# Patient Record
Sex: MALE | Race: WHITE | NOT HISPANIC OR LATINO | Employment: OTHER | URBAN - METROPOLITAN AREA
[De-identification: names, ages, dates, MRNs, and addresses within clinical notes are randomized per-mention and may not be internally consistent; named-entity substitution may affect disease eponyms.]

---

## 2023-07-13 LAB
PROSTATE SPECIFIC AG (NG/ML) IN SER/PLAS: 0.7 NG/ML (ref 0–4)
PROSTATE SPECIFIC AG FREE (NG/ML) IN SER/PLAS: 0.2 NG/ML
PROSTATE SPECIFIC AG FREE/PROSTATE SPECIFIC AG TOTAL IN SER/PLAS: 29 %

## 2023-09-30 PROBLEM — G56.03 BILATERAL CARPAL TUNNEL SYNDROME: Status: ACTIVE | Noted: 2023-09-30

## 2023-09-30 PROBLEM — I10 ESSENTIAL HYPERTENSION: Status: ACTIVE | Noted: 2023-09-30

## 2023-09-30 PROBLEM — H55.00 NYSTAGMUS: Status: ACTIVE | Noted: 2023-09-30

## 2023-09-30 PROBLEM — K42.9 UMBILICAL HERNIA: Status: ACTIVE | Noted: 2023-09-30

## 2023-09-30 PROBLEM — I65.29 CAROTID ARTERY STENOSIS: Status: ACTIVE | Noted: 2023-09-30

## 2023-09-30 PROBLEM — R26.89 BALANCE PROBLEMS: Status: ACTIVE | Noted: 2023-09-30

## 2023-09-30 PROBLEM — N20.0 KIDNEY STONE ON LEFT SIDE: Status: ACTIVE | Noted: 2023-09-30

## 2023-09-30 PROBLEM — E78.5 HYPERLIPIDEMIA: Status: ACTIVE | Noted: 2023-09-30

## 2023-09-30 PROBLEM — G47.9 SLEEP DISORDER: Status: ACTIVE | Noted: 2023-09-30

## 2023-09-30 PROBLEM — N18.30 CHRONIC KIDNEY DISEASE (CKD), STAGE III (MODERATE) (MULTI): Status: ACTIVE | Noted: 2023-09-30

## 2023-09-30 PROBLEM — H34.9 RETINAL EMBOLUS: Status: ACTIVE | Noted: 2023-09-30

## 2023-09-30 PROBLEM — S49.90XA: Status: ACTIVE | Noted: 2023-09-30

## 2023-09-30 PROBLEM — N28.9 RENAL INSUFFICIENCY: Status: ACTIVE | Noted: 2023-09-30

## 2023-09-30 PROBLEM — I25.10 CAD (CORONARY ARTERY DISEASE): Status: ACTIVE | Noted: 2023-09-30

## 2023-09-30 PROBLEM — H53.2 DIPLOPIA: Status: ACTIVE | Noted: 2023-09-30

## 2023-09-30 PROBLEM — E79.0 HYPERURICEMIA: Status: ACTIVE | Noted: 2023-09-30

## 2023-09-30 PROBLEM — D69.6 THROMBOCYTOPENIA (CMS-HCC): Status: ACTIVE | Noted: 2023-09-30

## 2023-09-30 PROBLEM — I48.91 ATRIAL FIBRILLATION (MULTI): Status: ACTIVE | Noted: 2023-09-30

## 2023-09-30 RX ORDER — METOPROLOL TARTRATE 50 MG/1
1 TABLET ORAL EVERY 12 HOURS
COMMUNITY
Start: 2021-08-03 | End: 2024-02-01

## 2023-09-30 RX ORDER — LOSARTAN POTASSIUM 50 MG/1
50 TABLET ORAL DAILY
COMMUNITY
Start: 2023-02-07 | End: 2024-02-14 | Stop reason: SDUPTHER

## 2023-09-30 RX ORDER — FEBUXOSTAT 40 MG/1
1 TABLET, FILM COATED ORAL DAILY
COMMUNITY
Start: 2021-03-19 | End: 2024-02-14 | Stop reason: SDUPTHER

## 2023-09-30 RX ORDER — ASPIRIN 81 MG/1
1 TABLET ORAL DAILY
COMMUNITY

## 2023-09-30 RX ORDER — CELECOXIB 200 MG/1
CAPSULE ORAL
COMMUNITY
Start: 2023-01-31

## 2023-09-30 RX ORDER — HYDROCHLOROTHIAZIDE 12.5 MG/1
1 TABLET ORAL DAILY
COMMUNITY
Start: 2021-08-03 | End: 2024-02-14 | Stop reason: SDUPTHER

## 2023-09-30 RX ORDER — LISINOPRIL 20 MG/1
1 TABLET ORAL 2 TIMES DAILY
COMMUNITY
Start: 2021-08-03 | End: 2024-02-15 | Stop reason: WASHOUT

## 2023-09-30 RX ORDER — RIVAROXABAN 20 MG/1
1 TABLET, FILM COATED ORAL DAILY
COMMUNITY
Start: 2021-08-03

## 2023-09-30 RX ORDER — ZOLPIDEM TARTRATE 5 MG/1
5 TABLET ORAL NIGHTLY PRN
COMMUNITY
Start: 2021-02-04 | End: 2024-03-08 | Stop reason: SDUPTHER

## 2023-09-30 RX ORDER — CALCIUM CARBONATE/VITAMIN D3 600MG-5MCG
1 TABLET ORAL DAILY
COMMUNITY

## 2023-09-30 RX ORDER — SIMVASTATIN 40 MG/1
1 TABLET, FILM COATED ORAL NIGHTLY
COMMUNITY
Start: 2021-08-03 | End: 2024-02-01

## 2023-10-04 ENCOUNTER — EVALUATION (OUTPATIENT)
Dept: PHYSICAL THERAPY | Facility: CLINIC | Age: 78
End: 2023-10-04
Payer: MEDICARE

## 2023-10-04 DIAGNOSIS — R26.89 BALANCE PROBLEMS: Primary | ICD-10-CM

## 2023-10-04 DIAGNOSIS — R26.89 OTHER ABNORMALITIES OF GAIT AND MOBILITY: ICD-10-CM

## 2023-10-04 DIAGNOSIS — R53.1 GENERAL WEAKNESS: ICD-10-CM

## 2023-10-04 PROCEDURE — 97162 PT EVAL MOD COMPLEX 30 MIN: CPT | Mod: GP,KX

## 2023-10-04 ASSESSMENT — BALANCE ASSESSMENTS
SITTING WITH BACK UNSUPPORTED BUT FEET SUPPORTED ON FLOOR OR ON A STOOL: ABLE TO SIT SAFELY AND SECURELY FOR 2 MINUTES
REACHING FORWARD WITH OUTSTRETCHED ARM WHILE STANDING: CAN REACH FORWARD 12 CM (5 INCHES)
PICK UP OBJECT FROM THE FLOOR FROM A STANDING POSITION: ABLE TO PICK UP SLIPPER BUT NEEDS SUPERVISION
PLACE ALTERNATE FOOT ON STEP OR STOOL WHILE STANDING UNSUPPORTED: LOOKS BEHIND FROM BOTH SIDES AND WEIGHT SHIFTS WELL
TURN 360 DEGREES: ABLE TO TURN 360 DEGREES SAFELY ONE SIDE ONLY 4 SECONDS OR LESS
STANDING TO SITTING: ABLE TO STAND INDEPENDENTLY USING HANDS
STANDING UNSUPPORTED: ABLE TO STAND 2 MINUTES WITH SUPERVISION
STANDING UNSUPPORTED WITH EYES CLOSED: NEEDS HELP TO KEEP FROM FALLING
TRANSFERS: ABLE TO TRANSFER SAFELY WITH MINOR USE OF HANDS
STANDING TO SITTING: SITS SAFELY WITH MINIMAL USE OF HANDS
STANDING UNSUPPORTED WITH FEET TOGETHER: ABLE TO PLACE FEET TOGETHER INDEPENDENTLY BUT UNABLE TO HOLD FOR 30 SECONDS

## 2023-10-04 ASSESSMENT — ENCOUNTER SYMPTOMS
DEPRESSION: 0
OCCASIONAL FEELINGS OF UNSTEADINESS: 0
LOSS OF SENSATION IN FEET: 1

## 2023-10-04 NOTE — PROGRESS NOTES
Physical Therapy    Physical Therapy Evaluation    Patient Name: Terrell Malhotra  MRN: [id number]  Today's Date: 10/4/2023  Time Calculation  Start Time: 1400  Stop Time: 1450  Time Calculation (min): 50 min    Assessment  Pt is a 77 year old male presenting to PT with gait/mobility impairments. Standardized testing of BAIG, sensation, MMT and ABC reveal that pt has multiple impairments in body structures/functions, activity limitations and participation restrictions. These include subjective and objective findings such as decreased righting reactions, poor sensation, decreased LE strength and impaired static balance.  Pt to benefit from skilled PT interventions to improve functional mobility deficits to improve independence and decrease fall risk.        Plan  Treatment/Interventions: Education/ Instruction, Gait training, Neuromuscular re-education, Therapeutic activities, Therapeutic exercises  PT Plan: Skilled PT  PT Frequency: 2 times per week  Duration: 6 weeks  Onset Date: 10/01/19  Certification Period Start Date: 10/04/23  Certification Period End Date: 01/04/24  Rehab Potential: Good        Subjective   Pt reports to PT ambulatory without  device, accompanied by spouse Carol. Pt reports having issues with balance over the last five years. Pt reports, at times in standing feels as if he is jerking back and forth. Additionally has had ~2 falls due to tripping over objects.     General:  General  Reason for Referral: Abnormalities of gait/mobility  Referred By: Dr Carlos Eduardo Han  Precautions:  Precautions  STEADI Fall Risk Score (The score of 4 or more indicates an increased risk of falling): 3  Home Living:  Home Living Comment: [place], [address].  Prior Function Per Pt/Caregiver Report:  Level of [place]: Independent with ADLs and functional transfers    Objective     Strength:   Right lower extremity  Hip; WFL  Knee; WFL  Ankle dorsiflexion; 4-/5  Ankle plantarflexion; 4-/5    Left  lower extremity  Hip; WFL  Knee; WFL  Ankle dorsiflexion; 4-/5  Ankle plantarflexion; 4-/5   Sensation:  Impaired sensation in B feet         Outcome Measures:  Baig Balance Scale  1. Sitting to Standing: Able to stand independently using hands  2. Standing Unsupported: Able to stand 2 minutes with supervision  3. Sitting with Back Unsupported but Feet Supported on Floor or on a Stool: Able to sit safely and securely for 2 minutes  4. Standing to Sitting: Sits safely with minimal use of hands  5.  Transfers: Able to transfer safely with minor use of hands  6. Standing Unsupported with Eyes Closed: Needs help to keep from falling  7. Standing Unsupported with Feet Together: Able to place feet together independently but unable to hold for 30 seconds  8. Reach Forward with Outstretched Arm While Standing: Can reach forward 12 cm (5 inches)  9.  Object from Floor from a Standing Position: Able to  slipper but needs supervision  10. Turning to Look Behind Over Left and Right Shoulders While Standing: Looks behind from both sides and weight shifts well  11. Turn 360 Degrees: Able to turn 360 degrees safely one side only 4 seconds or less         Other Measures  Activities - Specific Balance Confidence Scale: 75.625%     OP EDUCATION:  Education  Individual(s) Educated: Patient, Spouse  Education Provided: Fall Risk, POC, Home Exercise Program (HEP; tandem stance, SLS, romberg stance, heel raises)  Plan of Care Discussed and Agreed Upon: yes  Patient Response to Education: Patient/Caregiver Verbalized Understanding of Information    Goals:  Pt will improve BAIG by 4 points to meet LATIA  Pt will improve ABC by 5% to improve confidence with mobility  Pt will report 0 falls throughout treatment  Pt will demonstrate independence with HEP

## 2023-10-16 ENCOUNTER — APPOINTMENT (OUTPATIENT)
Dept: PHYSICAL THERAPY | Facility: CLINIC | Age: 78
End: 2023-10-16
Payer: MEDICARE

## 2023-10-18 ENCOUNTER — APPOINTMENT (OUTPATIENT)
Dept: PHYSICAL THERAPY | Facility: CLINIC | Age: 78
End: 2023-10-18
Payer: MEDICARE

## 2023-10-24 ENCOUNTER — TREATMENT (OUTPATIENT)
Dept: PHYSICAL THERAPY | Facility: CLINIC | Age: 78
End: 2023-10-24
Payer: MEDICARE

## 2023-10-24 DIAGNOSIS — R26.89 BALANCE PROBLEMS: ICD-10-CM

## 2023-10-24 DIAGNOSIS — R53.1 GENERAL WEAKNESS: ICD-10-CM

## 2023-10-24 PROCEDURE — 97530 THERAPEUTIC ACTIVITIES: CPT | Mod: GP,KX

## 2023-10-24 NOTE — PROGRESS NOTES
Physical Therapy    Patient Name: Terrell Malhotra  MRN: [id number]  Today's Date: 10/24/2023  Time Calculation  Start Time: 1019  Stop Time: 1101  Time Calculation (min): 42 min    Insurance reviewed   Visit number: 2   MC 90D 1/4/24    Assessment:  Session focused on improving dynamic balance. Pt requires up to min assist to maintain balance with activity especially challenged with activities in prolonged SLS. Pt remains motivated and participatory throughout. Pt to benefit from continued skilled PT services to further progress balance training for improved functionality.       Plan;  Continue to progress  Dynamic balance  Static balance especially SLS    Current Problem:   1. Balance problems  Follow Up In Physical Therapy      2. General weakness  Follow Up In Physical Therapy          Subjective   Pt reports to PT ambulatory without a device. No medical changes. No falls    Treatments:    Therapeutic Activity (62000): timed minutes 42, units 3   Dynamic warm up; 10 ft each; side stepping x 4, backward amb x 4, high stepping x 2    To improve dynamic balance  -Four square stepping; CW direction x 4 minutes, 2 minutes with change in direction based off therapist cues including diagonal stepping  -Agility ladder, each the length of the ladder; one foot in each square x 6, narrow to wide CARLIE x 4, modified icky shuffle x 4  -Stepping over x 6 hurdles; x 2 with each lead leg  -Pt amb for 3 minutes while therapist provides posterior perturbations to initiate reactive stepping  -In staggered stance; completes resistive reactive stepping; each LE x 12  -Perturbations    Education and discussion on HEP and treatment regarding the benefits related to current condition, POC, pathophysiology, and precautions      Goals;   Pt will improve BAIG by 4 points to meet LATIA  Pt will improve ABC by 5% to improve confidence with mobility  Pt will report 0 falls throughout treatment  Pt will demonstrate independence with HEP

## 2023-10-26 ENCOUNTER — TREATMENT (OUTPATIENT)
Dept: PHYSICAL THERAPY | Facility: CLINIC | Age: 78
End: 2023-10-26
Payer: MEDICARE

## 2023-10-26 DIAGNOSIS — R53.1 GENERAL WEAKNESS: ICD-10-CM

## 2023-10-26 DIAGNOSIS — R26.89 BALANCE PROBLEMS: ICD-10-CM

## 2023-10-26 PROCEDURE — 97530 THERAPEUTIC ACTIVITIES: CPT | Mod: GP,KX

## 2023-10-26 NOTE — PROGRESS NOTES
Physical Therapy    Patient Name: Terrell Malhotra  MRN: [id number]  Today's Date: 10/26/2023  Time Calculation  Start Time: 1017  Stop Time: 1058  Time Calculation (min): 41 min    Insurance reviewed   Visit number: 3   MC 90D 1/4/24    Assessment:  Session focused on improving dynamic balance. With reactive stepping practice, pt with difficulty with posterior stepping taking multiple steps to maintain balance. Pt challenged with alteration of surface type with up to CGA on foam surface.  Pt to benefit from continued skilled PT services to further progress balance training for improved functionality.       Plan;  Continue to progress  Picking up objects from floor  Floor to stand transfer    Current Problem:   1. Balance problems  Follow Up In Physical Therapy      2. General weakness  Follow Up In Physical Therapy          Subjective   Pt reports to PT ambulatory without a device. No medical changes. No falls    Treatments:    Therapeutic Activity (24750): timed minutes 42, units 3     To improve righting reactions   -Utilized overhead harness completing slip ; x 5 minutes and x 8 minutes    To improve SLS stability  -Standing on foam surface completing toe taps onto cones; 2 minutes x 2    To improve stability to  objects  -Squats on foam pad; 2 x 15, U UE support     Education and discussion on HEP and treatment regarding the benefits related to current condition, POC, pathophysiology, and precautions      Goals;   Pt will improve BAIG by 4 points to meet LATIA  Pt will improve ABC by 5% to improve confidence with mobility  Pt will report 0 falls throughout treatment  Pt will demonstrate independence with HEP

## 2023-10-31 ENCOUNTER — TREATMENT (OUTPATIENT)
Dept: PHYSICAL THERAPY | Facility: CLINIC | Age: 78
End: 2023-10-31
Payer: MEDICARE

## 2023-10-31 DIAGNOSIS — R53.1 GENERAL WEAKNESS: ICD-10-CM

## 2023-10-31 DIAGNOSIS — R26.89 BALANCE PROBLEMS: ICD-10-CM

## 2023-10-31 PROCEDURE — 97530 THERAPEUTIC ACTIVITIES: CPT | Mod: GP,KX

## 2023-10-31 NOTE — PROGRESS NOTES
Physical Therapy    Patient Name: Terrell Malhotra  MRN: [id number]  Today's Date: 10/31/2023  Time Calculation  Start Time: 1107  Stop Time: 1145  Time Calculation (min): 38 min    Insurance reviewed   Visit number: 4   MC 90D 1/4/24    Assessment:  Session focused on improving ability to complete floor to stand transfers and retrieving objects from floor level. Pt challenged with transitioning tall kneel to half kneel to stand, likely due to LE and core weakness. Pt receptive to education regarding sequencing of both tasks however, continued follow up likely required . Pt to benefit from continued skilled PT services to further progress balance training for improved functionality.       Plan;  Continue to progress  Floor to stand transfers  Righting reactions  Dynamic balance    Current Problem:   1. Balance problems  Follow Up In Physical Therapy      2. General weakness  Follow Up In Physical Therapy          Subjective   Pt reports to PT ambulatory without a device. No medical changes. No falls    Treatments:    Therapeutic Activity (66895): timed minutes 42, units 3     To improve floor to stand transfer  -Pt demonstrates ability to independently complete floor to stand transfer with usage of UE support on mat table; with removal of UE support, pt unable to transition tall kneel to half kneel  --Completed tall kneel to half kneel transition with UE support on body bar; x 12 reciprocally with therapist stabilizing bar, x 12 reciprocally without therapist stabilization support  --Standing half lunges; 2 x 12 each LE lead, U UE support    To improve picking up objects off floor  -Squatting picking up bean bags x 15, CGA  -Pt amb to objects on the floor to practice squat technique; x 15; CGA due to change in direction    Education and discussion on HEP and treatment regarding the benefits related to current condition, POC, pathophysiology, and precautions      Goals;   Pt will improve BAIG by 4 points to meet LATIA  Pt  will improve ABC by 5% to improve confidence with mobility  Pt will report 0 falls throughout treatment  Pt will demonstrate independence with HEP

## 2023-11-02 ENCOUNTER — TREATMENT (OUTPATIENT)
Dept: PHYSICAL THERAPY | Facility: CLINIC | Age: 78
End: 2023-11-02
Payer: MEDICARE

## 2023-11-02 DIAGNOSIS — R26.89 BALANCE PROBLEMS: ICD-10-CM

## 2023-11-02 DIAGNOSIS — R53.1 GENERAL WEAKNESS: ICD-10-CM

## 2023-11-02 PROCEDURE — 97110 THERAPEUTIC EXERCISES: CPT | Mod: GP,KX

## 2023-11-02 NOTE — PROGRESS NOTES
Physical Therapy    Patient Name: Terrell Malhotra  MRN: [id number]  Today's Date: 11/2/2023  Time Calculation  Start Time: 1117  Stop Time: 1145  Time Calculation (min): 28 min    Insurance reviewed   Visit number: 5   MC 90D 1/4/24    Assessment:  Session focused on improving floor to stand transfers. Pt challenged with core related exercise which likely is contributing factor to difficulty with floor to stand transfers.  Pt to benefit from continued skilled PT services to further progress balance training for improved functionality.       Plan;  Continue to progress  Floor to stand transfers  Righting reactions  Dynamic balance    Current Problem:   1. Balance problems  Follow Up In Physical Therapy      2. General weakness  Follow Up In Physical Therapy          Subjective   Pt reports to PT ambulatory without a device. No medical changes. No falls    Treatments:    Therapeutic Exercise (11384): timed minutes 25, units 2     To improve floor to stand transfer  --Standing half lunges; 2 x 12 each LE lead, U UE support  --Palof press; 7.5#; 2 x 15  --Standing march on foam surface with 2# on each LE; 2 x 2 minutes, UE support PRN      Education and discussion on HEP and treatment regarding the benefits related to current condition, POC, pathophysiology, and precautions      Goals;   Pt will improve BAIG by 4 points to meet LATIA  Pt will improve ABC by 5% to improve confidence with mobility  Pt will report 0 falls throughout treatment  Pt will demonstrate independence with HEP

## 2023-11-07 ENCOUNTER — TREATMENT (OUTPATIENT)
Dept: PHYSICAL THERAPY | Facility: CLINIC | Age: 78
End: 2023-11-07
Payer: MEDICARE

## 2023-11-07 DIAGNOSIS — R53.1 GENERAL WEAKNESS: ICD-10-CM

## 2023-11-07 DIAGNOSIS — R26.89 BALANCE PROBLEMS: ICD-10-CM

## 2023-11-07 PROCEDURE — 97530 THERAPEUTIC ACTIVITIES: CPT | Mod: GP,KX

## 2023-11-07 PROCEDURE — 97110 THERAPEUTIC EXERCISES: CPT | Mod: GP,KX

## 2023-11-07 NOTE — PROGRESS NOTES
Physical Therapy    Patient Name: Terrell Malhotra  MRN: [id number]  Today's Date: 11/7/2023  Time Calculation  Start Time: 1102  Stop Time: 1145  Time Calculation (min): 43 min    Insurance reviewed   Visit number: 6   MC 90D 1/4/24    Assessment:  Session focused on improving balance. Pt continues to be challenged in half kneel position which limits ability to complete floor to stand transfers without UE support. Pt to benefit from continued skilled PT services to further progress balance training for improved functionality.       Plan;  Continue to progress  Floor to stand transfers  Righting reactions  Dynamic balance  Improving step height    Current Problem:   1. Balance problems  Follow Up In Physical Therapy      2. General weakness  Follow Up In Physical Therapy          Subjective   Pt reports to PT ambulatory without a device. No medical changes. No falls    Treatments:    Therapeutic Exercise (61129): timed minutes 25, units 2   Dynamic warm up; standing squats 2 x 12, calf raises x 15    To improve floor to stand transfer  --Standing half lunges; 2 x 12 each LE lead, U UE support  --Lunge hold at floor level; 4 x 30 second holds each LE lead    Therapeutic Activity (62403): timed minutes 15, units 1    To improve standing balance  -Reactive stepping with blue theraband at ankle level; x 12 each LE, U UE support PRN  -Stepping forward over x 5 hurdles reciprocally; 6 sets in total; occasional min assist to maintain stability in prolonged SLS     Education and discussion on HEP and treatment regarding the benefits related to current condition, POC, pathophysiology, and precautions      Goals;   Pt will improve BAIG by 4 points to meet LATIA  Pt will improve ABC by 5% to improve confidence with mobility  Pt will report 0 falls throughout treatment  Pt will demonstrate independence with HEP

## 2023-11-09 ENCOUNTER — TREATMENT (OUTPATIENT)
Dept: PHYSICAL THERAPY | Facility: CLINIC | Age: 78
End: 2023-11-09
Payer: MEDICARE

## 2023-11-09 DIAGNOSIS — R26.89 BALANCE PROBLEMS: ICD-10-CM

## 2023-11-09 DIAGNOSIS — R53.1 GENERAL WEAKNESS: ICD-10-CM

## 2023-11-09 PROCEDURE — 97530 THERAPEUTIC ACTIVITIES: CPT | Mod: GP,KX

## 2023-11-09 NOTE — PROGRESS NOTES
Physical Therapy    Patient Name: Terrell Malhotra  MRN: [id number]  Today's Date: 11/9/2023  Time Calculation  Start Time: 1057  Stop Time: 1139  Time Calculation (min): 42 min    Insurance reviewed   Visit number: 7   MC 90D 1/4/24    Assessment:  Session focused on improving stability in SLS position. Pt challenged with prolonged stance, requiring up to min assist at times. Improves with repetitions however does experience fatigue which diminishes stability. Pt to benefit from continued skilled PT services to further progress balance training for improved functionality.       Plan;  Continue to progress  Floor to stand transfers  Righting reactions  Dynamic balance  Improving step height    Current Problem:   1. Balance problems  Follow Up In Physical Therapy      2. General weakness  Follow Up In Physical Therapy          Subjective   Pt reports to PT ambulatory without a device. No medical changes. No falls    Treatments:      Therapeutic Activity (08653): timed minutes 40, units 3    As a dynamic warm up, each 10 ft; side stepping x 2 each way, high stepping x 3, toe amb x 2, heel amb x 2    To improve SLS stability  -Toe taps onto 8 inch riser x 20 reciprocally, onto 12 inch riser x 20 reciprocally, no UE support  -Step ups onto 8 inch riser; x 15 no UE support  -Stepping forward over x 5 hurdles; x 4, stepping laterally over x 5 hurdles x 2 each direction, stepping forward over x 5 hurdles onto foam pad surface x 4; all with no UE support  -Amb across stepping stones in narrow CARLIE; 2 minutes x 2, attempted without UE support  -Standing in SLS while contralateral LE rolls ball FW/BW; x 75 seconds each LE    Education and discussion on HEP and treatment regarding the benefits related to current condition, POC, pathophysiology, and precautions      Goals;   Pt will improve BAIG by 4 points to meet LATIA  Pt will improve ABC by 5% to improve confidence with mobility  Pt will report 0 falls throughout treatment  Pt will  demonstrate independence with HEP

## 2023-11-14 ENCOUNTER — TREATMENT (OUTPATIENT)
Dept: PHYSICAL THERAPY | Facility: CLINIC | Age: 78
End: 2023-11-14
Payer: MEDICARE

## 2023-11-14 DIAGNOSIS — R26.89 BALANCE PROBLEMS: ICD-10-CM

## 2023-11-14 DIAGNOSIS — R53.1 GENERAL WEAKNESS: ICD-10-CM

## 2023-11-14 PROCEDURE — 97530 THERAPEUTIC ACTIVITIES: CPT | Mod: GP,KX

## 2023-11-14 NOTE — PROGRESS NOTES
Physical Therapy    Patient Name: Terrell Malhotra  MRN: [id number]  Today's Date: 11/14/2023  Time Calculation  Start Time: 1101  Stop Time: 1145  Time Calculation (min): 44 min    Insurance reviewed   Visit number: 8   MC 90D 1/4/24    Assessment:  Session focused on improving reactive stepping and LE power. With resistive walk outs pt with difficulty with control requiring up to CGA to maintain stability. Pt with continued difficulty with floor to stand transfers due to instability in half kneel position.  Pt to benefit from continued skilled PT services to further progress balance training for improved functionality.       Plan;  Continue to progress  Floor to stand transfers  Righting reactions  Dynamic balance  Improving step height    Current Problem:   1. Balance problems  Follow Up In Physical Therapy      2. General weakness  Follow Up In Physical Therapy          Subjective   Pt reports to PT ambulatory without a device. No medical changes. No falls    Treatments:    Therapeutic Activity (30850): timed minutes 40, units 3    As a dynamic warm up, each 10 ft; side stepping x 2 each way, high stepping x 3, toe amb x 2, heel amb x 2    To improve reactive stepping  -FW-->BW amb with 7.5#; 10 ft x 8  -BW-->FW amb with 7.5#; 10 ft x 8  -Reactive stepping with purple resistance band; x 10 stepping each LE lead    To improve floor to stand transfers  -Half kneel hold in erect position; 2 x 60 second     To improve LE power for floor to stand transfer  -Runners step up with 3# on each LE; x 20 reciprocally, U UE support    Education and discussion on HEP and treatment regarding the benefits related to current condition, POC, pathophysiology, and precautions      Goals;   Pt will improve BAIG by 4 points to meet LATIA  Pt will improve ABC by 5% to improve confidence with mobility  Pt will report 0 falls throughout treatment  Pt will demonstrate independence with HEP   English

## 2023-11-16 ENCOUNTER — TREATMENT (OUTPATIENT)
Dept: PHYSICAL THERAPY | Facility: CLINIC | Age: 78
End: 2023-11-16
Payer: MEDICARE

## 2023-11-16 DIAGNOSIS — R53.1 GENERAL WEAKNESS: ICD-10-CM

## 2023-11-16 DIAGNOSIS — R26.89 BALANCE PROBLEMS: ICD-10-CM

## 2023-11-16 PROCEDURE — 97530 THERAPEUTIC ACTIVITIES: CPT | Mod: GP,KX

## 2023-11-16 NOTE — PROGRESS NOTES
Physical Therapy    Patient Name: Terrell Malhotra  MRN: [id number]  Today's Date: 11/16/2023  Time Calculation  Start Time: 1100  Stop Time: 1143  Time Calculation (min): 43 min    Insurance reviewed   Visit number: 9   MC 90D 1/4/24    Assessment:  Session focused on improving dynamic balance. With each new activity, difficulties initially however pt demonstrates a learning process and is able to adapt balance appropriately. Continued difficulty with change of surfaces with foam/uneven terrain. Pt to benefit from continued skilled PT services to further progress balance training for improved functionality.       Plan;  Reassessment of goals and prepare for discharge    Current Problem:   1. Balance problems  Follow Up In Physical Therapy      2. General weakness  Follow Up In Physical Therapy          Subjective   Pt reports to PT ambulatory without a device. No medical changes. No falls    Treatments:    Therapeutic Activity (62980): timed minutes 40, units 3    With usage of overhead harness completed the following to challenge dynamic balance  -Tandem amb on balance beam; 12 ft x 6  -Stepping with narrow CARLIE on stepping stones; 12 ft x 6  -Stepping over x 6 hurdles; forward x 4  -Stepping over x 6 hurdles; laterally each direction x 2  -Stepping over x 4 hurdles; with foam surfaces between hurdles forward x 4  -Stepping across mat surface with stepping stones beneath to simulate uneven surfaces; x 6  -Reactive stepping through therapist guided posterior pulls x 12      Education and discussion on HEP and treatment regarding the benefits related to current condition, POC, pathophysiology, and precautions      Goals;   Pt will improve BAIG by 4 points to meet LATIA  Pt will improve ABC by 5% to improve confidence with mobility  Pt will report 0 falls throughout treatment  Pt will demonstrate independence with HEP

## 2023-11-21 ENCOUNTER — APPOINTMENT (OUTPATIENT)
Dept: PHYSICAL THERAPY | Facility: CLINIC | Age: 78
End: 2023-11-21
Payer: MEDICARE

## 2023-11-28 ENCOUNTER — TREATMENT (OUTPATIENT)
Dept: PHYSICAL THERAPY | Facility: CLINIC | Age: 78
End: 2023-11-28
Payer: MEDICARE

## 2023-11-28 DIAGNOSIS — R53.1 GENERAL WEAKNESS: ICD-10-CM

## 2023-11-28 DIAGNOSIS — R26.89 BALANCE PROBLEMS: ICD-10-CM

## 2023-11-28 PROCEDURE — 97530 THERAPEUTIC ACTIVITIES: CPT | Mod: GP,KX

## 2023-11-28 ASSESSMENT — BALANCE ASSESSMENTS
REACHING FORWARD WITH OUTSTRETCHED ARM WHILE STANDING: CAN REACH FORWARD 12 CM (5 INCHES)
PLACE ALTERNATE FOOT ON STEP OR STOOL WHILE STANDING UNSUPPORTED: ABLE TO STAND INDEPENDENTLY AND SAFELY AND COMPLETE 8 STEPS IN 20 SECONDS
STANDING TO SITTING: SITS SAFELY WITH MINIMAL USE OF HANDS
STANDING UNSUPPORTED WITH FEET TOGETHER: ABLE TO PLACE FEET TOGETHER INDEPENDENTLY AND STAND 1 MINUTE SAFELY
SITTING WITH BACK UNSUPPORTED BUT FEET SUPPORTED ON FLOOR OR ON A STOOL: ABLE TO SIT SAFELY AND SECURELY FOR 2 MINUTES
STANDING UNSUPPORTED WITH EYES CLOSED: ABLE TO STAND 10 SECONDS SAFELY
TURN 360 DEGREES: ABLE TO TURN 360 DEGREES SAFELY IN 4 SECONDS OR LESS
STANDING TO SITTING: ABLE TO STAND WITHOUT USING HANDS AND STABILIZE INDEPENDENTLY
STANDING UNSUPPORTED ONE FOOT IN FRONT: ABLE TO TAKE SMALL STEP INDEPENDENTLY AND HOLD 30 SECONDS
PLACE ALTERNATE FOOT ON STEP OR STOOL WHILE STANDING UNSUPPORTED: LOOKS BEHIND FROM BOTH SIDES AND WEIGHT SHIFTS WELL
STANDING ON ONE LEG: TRIES TO LIFT LEG UNABLE TO HOLD 3 SECONDS BUT REMAINS STANDING INDEPENDENTLY
LONG VERSION TOTAL SCORE (MAX 56): 50
STANDING UNSUPPORTED: ABLE TO STAND SAFELY FOR 2 MINUTES
PICK UP OBJECT FROM THE FLOOR FROM A STANDING POSITION: ABLE TO PICK UP SLIPPER SAFELY AND EASILY
TRANSFERS: ABLE TO TRANSFER SAFELY WITH MINOR USE OF HANDS

## 2023-11-28 NOTE — PROGRESS NOTES
Physical Therapy    Patient Name: Terrell Malhotra  MRN: [id number]  Today's Date: 11/28/2023  Time Calculation  Start Time: 1103  Stop Time: 1145  Time Calculation (min): 42 min    Insurance reviewed   Visit number: 10   MC 90D 1/4/24    Assessment:  Session focused on reassessment of goals. Pt achieved 4/4 goals as set on evaluation. Pt and spouse comfortable with pt being discharged at this point. Provided with HEP and urged pt to maintain level of physicality at discharge, especially with coming colder months.       Plan;  Discharge    Current Problem:   1. Balance problems  Follow Up In Physical Therapy      2. General weakness  Follow Up In Physical Therapy          Subjective   Pt reports to PT ambulatory without a device. No medical changes. No falls    Treatments:    Therapeutic Activity (46563): timed minutes 38, units 3    -BAIG; 50/56  -ABC; 83.75%    Prescribed HEP providing pt with demonstration and handouts;   -Side stepping  -Backward amb  -Tandem stance  -SLS   -Romberg eyes closed  -Romberg head rotations  -Heel raises    Education and discussion on HEP and treatment regarding the benefits related to current condition, POC, pathophysiology, and precautions      Goals;   Pt will improve BAIG by 4 points to meet LATIA GOAL ACHIEVED   Pt will improve ABC by 5% to improve confidence with mobility GOAL ACHIEVED  Pt will report 0 falls throughout treatment GOAL ACHIEVED  Pt will demonstrate independence with HEP GOAL ACHIEVED

## 2023-12-26 ENCOUNTER — DOCUMENTATION (OUTPATIENT)
Dept: PHYSICAL THERAPY | Facility: CLINIC | Age: 78
End: 2023-12-26
Payer: MEDICARE

## 2023-12-26 NOTE — PROGRESS NOTES
Physical Therapy    Discharge Summary    Name: Terrell Malhotra  MRN: [id number]  : 1945  Date: 23    Discharge Summary: PT    Discharge Information: Date of last visit 23    Therapy Summary: Pt last seen  and completed goal reassessment. Pt achieved 4/4 goals and felt comfortable with discharge from therapy. Chart left open for 30 days per protocol, pt to be formally discharged with current PT POC. Pt urged to follow up with PT in the future should needs arise.

## 2024-02-09 ENCOUNTER — APPOINTMENT (OUTPATIENT)
Dept: PRIMARY CARE | Facility: CLINIC | Age: 79
End: 2024-02-09
Payer: MEDICARE

## 2024-02-14 ENCOUNTER — OFFICE VISIT (OUTPATIENT)
Dept: PRIMARY CARE | Facility: CLINIC | Age: 79
End: 2024-02-14
Payer: MEDICARE

## 2024-02-14 VITALS
WEIGHT: 202 LBS | SYSTOLIC BLOOD PRESSURE: 109 MMHG | HEIGHT: 73 IN | BODY MASS INDEX: 26.77 KG/M2 | TEMPERATURE: 97.3 F | HEART RATE: 76 BPM | DIASTOLIC BLOOD PRESSURE: 63 MMHG

## 2024-02-14 DIAGNOSIS — I10 ESSENTIAL HYPERTENSION: ICD-10-CM

## 2024-02-14 DIAGNOSIS — Z87.891 FORMER SMOKER: ICD-10-CM

## 2024-02-14 DIAGNOSIS — E78.5 HYPERLIPIDEMIA, UNSPECIFIED HYPERLIPIDEMIA TYPE: ICD-10-CM

## 2024-02-14 DIAGNOSIS — E79.0 HYPERURICEMIA: ICD-10-CM

## 2024-02-14 DIAGNOSIS — Z00.00 MEDICARE ANNUAL WELLNESS VISIT, SUBSEQUENT: ICD-10-CM

## 2024-02-14 PROCEDURE — G0439 PPPS, SUBSEQ VISIT: HCPCS | Performed by: FAMILY MEDICINE

## 2024-02-14 PROCEDURE — 1036F TOBACCO NON-USER: CPT | Performed by: FAMILY MEDICINE

## 2024-02-14 PROCEDURE — 3078F DIAST BP <80 MM HG: CPT | Performed by: FAMILY MEDICINE

## 2024-02-14 PROCEDURE — 3074F SYST BP LT 130 MM HG: CPT | Performed by: FAMILY MEDICINE

## 2024-02-14 PROCEDURE — 1160F RVW MEDS BY RX/DR IN RCRD: CPT | Performed by: FAMILY MEDICINE

## 2024-02-14 PROCEDURE — 1170F FXNL STATUS ASSESSED: CPT | Performed by: FAMILY MEDICINE

## 2024-02-14 PROCEDURE — 1159F MED LIST DOCD IN RCRD: CPT | Performed by: FAMILY MEDICINE

## 2024-02-14 RX ORDER — FEBUXOSTAT 40 MG/1
40 TABLET, FILM COATED ORAL DAILY
Qty: 90 TABLET | Refills: 1 | Status: SHIPPED | OUTPATIENT
Start: 2024-02-14

## 2024-02-14 RX ORDER — SIMVASTATIN 40 MG/1
40 TABLET, FILM COATED ORAL NIGHTLY
Qty: 90 TABLET | Refills: 1 | Status: SHIPPED | OUTPATIENT
Start: 2024-02-14

## 2024-02-14 RX ORDER — HYDROCHLOROTHIAZIDE 12.5 MG/1
12.5 TABLET ORAL DAILY
Qty: 90 TABLET | Refills: 1 | Status: SHIPPED | OUTPATIENT
Start: 2024-02-14

## 2024-02-14 RX ORDER — LOSARTAN POTASSIUM 50 MG/1
50 TABLET ORAL DAILY
Qty: 90 TABLET | Refills: 1 | Status: SHIPPED | OUTPATIENT
Start: 2024-02-14

## 2024-02-14 RX ORDER — METOPROLOL TARTRATE 50 MG/1
50 TABLET ORAL EVERY 12 HOURS
Qty: 180 TABLET | Refills: 1 | Status: SHIPPED | OUTPATIENT
Start: 2024-02-14

## 2024-02-14 ASSESSMENT — ACTIVITIES OF DAILY LIVING (ADL)
BATHING: INDEPENDENT
DRESSING: INDEPENDENT
GROCERY_SHOPPING: INDEPENDENT
MANAGING_FINANCES: INDEPENDENT
DOING_HOUSEWORK: INDEPENDENT
TAKING_MEDICATION: INDEPENDENT

## 2024-02-14 ASSESSMENT — PATIENT HEALTH QUESTIONNAIRE - PHQ9
1. LITTLE INTEREST OR PLEASURE IN DOING THINGS: NOT AT ALL
SUM OF ALL RESPONSES TO PHQ9 QUESTIONS 1 AND 2: 0
2. FEELING DOWN, DEPRESSED OR HOPELESS: NOT AT ALL

## 2024-02-14 NOTE — PROGRESS NOTES
Elva Tejada is here today for an annual wellness visit and follow-up on hypertension and hyperlipidemia.  He states that overall he has been doing well.  He continues on his medications as noted without side effects.  He sees Dr. Uribe on a regular basis for cardiology care.  He also follows up with urology regarding his prostate care.  He continues to have issues with chronic balance problems.  He has not had any significant falls recently.  He had undergone physical therapy for this last year.    Patient ID: Terrell Malhotra is a 78 y.o. male who presents for Medicare Annual Wellness Visit Subsequent:    Problem List Items Addressed This Visit    None     Past Medical History:   Diagnosis Date    Body mass index (BMI) 26.0-26.9, adult     BMI 26.0-26.9,adult    Difficulty in walking, not elsewhere classified 10/15/2021    Impaired ambulation    History of falling     Status post fall    Muscle weakness (generalized) 10/15/2021    Generalized muscle weakness    Overweight     Overweight    Personal history of other specified conditions 01/02/2019    History of epistaxis    Personal history of other specified conditions 10/15/2021    History of gait disorder    Personal history of other specified conditions 10/15/2021    History of balance disorder    Personal history of other specified conditions 10/15/2021    History of unsteady gait    Repeated falls 10/15/2021    Repeated falls    Unspecified fracture of lower end of unspecified humerus, initial encounter for closed fracture     Elbow fracture      Past Surgical History:   Procedure Laterality Date    OTHER SURGICAL HISTORY  11/29/2021    Carotid artery reconstruction    OTHER SURGICAL HISTORY  11/29/2021    Carpal tunnel surgery    OTHER SURGICAL HISTORY  11/29/2021    Cataract surgery    OTHER SURGICAL HISTORY  11/29/2021    Endarterectomy    OTHER SURGICAL HISTORY  11/29/2021    Hernia repair    OTHER SURGICAL HISTORY  11/29/2021    Inguinal hernia repair     OTHER SURGICAL HISTORY  11/29/2021    Percutaneous transluminal coronary angioplasty    OTHER SURGICAL HISTORY  11/29/2021    Surgery    OTHER SURGICAL HISTORY  11/29/2021    Shoulder surgery    OTHER SURGICAL HISTORY  11/29/2021    Perianal abscess incision and drainage    OTHER SURGICAL HISTORY  11/29/2021    Foot surgery    OTHER SURGICAL HISTORY  11/29/2021    Elbow surgery    OTHER SURGICAL HISTORY  02/07/2022    Colonoscopy      Family History   Problem Relation Name Age of Onset    Cancer Mother      Stroke Mother        Social History     Socioeconomic History    Marital status:      Spouse name: Not on file    Number of children: Not on file    Years of education: Not on file    Highest education level: Not on file   Occupational History    Not on file   Tobacco Use    Smoking status: Former     Types: Cigarettes    Smokeless tobacco: Never   Substance and Sexual Activity    Alcohol use: Yes     Alcohol/week: 7.0 standard drinks of alcohol     Types: 7 Standard drinks or equivalent per week    Drug use: Not Currently    Sexual activity: Not on file   Other Topics Concern    Not on file   Social History Narrative    Not on file     Social Determinants of Health     Financial Resource Strain: Not on file   Food Insecurity: Not on file   Transportation Needs: Not on file   Physical Activity: Not on file   Stress: Not on file   Social Connections: Not on file   Intimate Partner Violence: Not on file   Housing Stability: Not on file      Allopurinol and Lisinopril   Current Outpatient Medications   Medication Sig Dispense Refill    aspirin 81 mg EC tablet Take 1 tablet (81 mg) by mouth once daily.      calcium carbonate-vitamin D3 600 mg-5 mcg (200 unit) tablet Take 1 tablet by mouth once daily.      docosahexaenoic acid/epa (FISH OIL ORAL) Take 1 capsule by mouth once daily.      febuxostat (Uloric) 40 mg tablet Take 1 tablet (40 mg) by mouth once daily.      hydroCHLOROthiazide (HYDRODiuril) 12.5 mg  tablet Take 1 tablet (12.5 mg) by mouth once daily.      losartan (Cozaar) 50 mg tablet Take 1 tablet (50 mg) by mouth once daily.      metoprolol tartrate (Lopressor) 50 mg tablet TAKE 1 TABLET EVERY 12 HOURS 180 tablet 1    simvastatin (Zocor) 40 mg tablet TAKE 1 TABLET AT BEDTIME 90 tablet 1    Xarelto 20 mg tablet Take 1 tablet (20 mg) by mouth once daily.      zolpidem (Ambien) 5 mg tablet Take 1 tablet (5 mg) by mouth as needed at bedtime for sleep.      celecoxib (CeleBREX) 200 mg capsule       lisinopril 20 mg tablet Take 1 tablet (20 mg) by mouth 2 times a day.       No current facility-administered medications for this visit.       Immunization History   Administered Date(s) Administered    Flu vaccine, quadrivalent, high-dose, preservative free, age 65y+ (FLUZONE) 10/20/2020    Influenza, High Dose Seasonal, Preservative Free 11/13/2014, 11/08/2017, 10/12/2018, 09/28/2019, 10/20/2020, 11/17/2021, 10/26/2022    Influenza, Unspecified 11/01/2007, 11/11/2008, 10/14/2010, 09/02/2012, 09/01/2015, 09/01/2016, 11/17/2021, 10/26/2022    Novel influenza-H1N1-09, preservative-free 12/13/2009    Pfizer COVID-19 vaccine, Fall 2023, 12 years and older, (30mcg/0.3mL) 10/24/2023    Pfizer COVID-19 vaccine, bivalent, age 12 years and older (30 mcg/0.3 mL) 09/30/2022    Pfizer Purple Cap SARS-CoV-2 02/05/2021, 03/05/2021, 04/08/2021, 10/07/2021, 09/27/2022    Pneumococcal conjugate vaccine, 13-valent (PREVNAR 13) 02/25/2016    Pneumococcal polysaccharide vaccine, 23-valent, age 2 years and older (PNEUMOVAX 23) 01/10/2013, 08/22/2018, 10/20/2021    Tdap vaccine, age 7 year and older (BOOSTRIX, ADACEL) 02/23/2018    Zoster vaccine, recombinant, adult (SHINGRIX) 08/30/2019, 12/31/2019, 02/01/2020    Zoster, live 08/24/2011        Review of Systems   Constitutional: Negative.    HENT: Negative.     Eyes: Negative.    Respiratory: Negative.     Cardiovascular: Negative.    Gastrointestinal: Negative.    Endocrine: Negative.     Genitourinary: Negative.    Musculoskeletal: Negative.    Skin: Negative.    Allergic/Immunologic: Negative.    Hematological: Negative.    Psychiatric/Behavioral: Negative.     All other systems reviewed and are negative.       Vitals:    02/14/24 1122   BP: 109/63   Pulse: 76   Temp: 36.3 °C (97.3 °F)     Vitals:    02/14/24 1122   Weight: 91.6 kg (202 lb)      Physical Exam  Constitutional:       General: He is not in acute distress.     Appearance: Normal appearance.   Cardiovascular:      Rate and Rhythm: Normal rate and regular rhythm.      Pulses: Normal pulses.      Heart sounds: Normal heart sounds. No murmur heard.     No friction rub. No gallop.   Pulmonary:      Effort: Pulmonary effort is normal. No respiratory distress.      Breath sounds: Normal breath sounds. No wheezing or rales.   Neurological:      General: No focal deficit present.      Mental Status: He is alert and oriented to person, place, and time. Mental status is at baseline.   Psychiatric:         Mood and Affect: Mood normal.         Thought Content: Thought content normal.        Medicare annual wellness visit  ASSESSMENT/PLAN: Hypertension stable.  Continue current medications as noted    Hyperlipidemia.  Continue simvastatin daily.  Check CMP and lipid profile.      Chronic balance disorder.  Recommended follow-up with physical therapy which the patient defers at the present time.  Discussed using a cane when ambulating especially in unfamiliar environments.    Coronary artery disease followed by cardiology  Atrial fibrillation followed by cardiology  History of chronic kidney disease  Chronic anticoagulation    Continue current meds as noted  Follow-up closely with cardiology and urology  Check CBC CMP lipid profile and uric acid  Recommended RSV vaccination  Follow-up in 6 months and call as needed     Scribe Attestation  By signing my name below, I, Stella Donovan LPN, Scribe   attest that this documentation has been prepared  under the direction and in the presence of Carlos Eduardo Han MD.

## 2024-02-15 ASSESSMENT — ENCOUNTER SYMPTOMS
CONSTITUTIONAL NEGATIVE: 1
EYES NEGATIVE: 1
RESPIRATORY NEGATIVE: 1
ENDOCRINE NEGATIVE: 1
ALLERGIC/IMMUNOLOGIC NEGATIVE: 1
HEMATOLOGIC/LYMPHATIC NEGATIVE: 1
MUSCULOSKELETAL NEGATIVE: 1
PSYCHIATRIC NEGATIVE: 1
CARDIOVASCULAR NEGATIVE: 1
GASTROINTESTINAL NEGATIVE: 1

## 2024-02-21 ENCOUNTER — TELEPHONE (OUTPATIENT)
Dept: PRIMARY CARE | Facility: CLINIC | Age: 79
End: 2024-02-21

## 2024-02-21 ENCOUNTER — LAB (OUTPATIENT)
Dept: LAB | Facility: LAB | Age: 79
End: 2024-02-21
Payer: MEDICARE

## 2024-02-21 DIAGNOSIS — E79.0 HYPERURICEMIA: ICD-10-CM

## 2024-02-21 DIAGNOSIS — I10 ESSENTIAL HYPERTENSION: ICD-10-CM

## 2024-02-21 DIAGNOSIS — E78.5 HYPERLIPIDEMIA, UNSPECIFIED HYPERLIPIDEMIA TYPE: ICD-10-CM

## 2024-02-21 LAB
ALBUMIN SERPL BCP-MCNC: 3.8 G/DL (ref 3.4–5)
ALP SERPL-CCNC: 68 U/L (ref 33–136)
ALT SERPL W P-5'-P-CCNC: 20 U/L (ref 10–52)
ANION GAP SERPL CALC-SCNC: 10 MMOL/L (ref 10–20)
AST SERPL W P-5'-P-CCNC: 20 U/L (ref 9–39)
BILIRUB SERPL-MCNC: 0.9 MG/DL (ref 0–1.2)
BUN SERPL-MCNC: 22 MG/DL (ref 6–23)
CALCIUM SERPL-MCNC: 9.3 MG/DL (ref 8.6–10.3)
CHLORIDE SERPL-SCNC: 101 MMOL/L (ref 98–107)
CHOLEST SERPL-MCNC: 140 MG/DL (ref 0–199)
CHOLESTEROL/HDL RATIO: 3
CO2 SERPL-SCNC: 33 MMOL/L (ref 21–32)
CREAT SERPL-MCNC: 1.38 MG/DL (ref 0.5–1.3)
EGFRCR SERPLBLD CKD-EPI 2021: 52 ML/MIN/1.73M*2
ERYTHROCYTE [DISTWIDTH] IN BLOOD BY AUTOMATED COUNT: 13 % (ref 11.5–14.5)
GLUCOSE SERPL-MCNC: 97 MG/DL (ref 74–99)
HCT VFR BLD AUTO: 44.5 % (ref 41–52)
HDLC SERPL-MCNC: 46.5 MG/DL
HGB BLD-MCNC: 14.6 G/DL (ref 13.5–17.5)
LDLC SERPL CALC-MCNC: 61 MG/DL
MCH RBC QN AUTO: 32.7 PG (ref 26–34)
MCHC RBC AUTO-ENTMCNC: 32.8 G/DL (ref 32–36)
MCV RBC AUTO: 100 FL (ref 80–100)
NON HDL CHOLESTEROL: 94 MG/DL (ref 0–149)
NRBC BLD-RTO: 0 /100 WBCS (ref 0–0)
PLATELET # BLD AUTO: 174 X10*3/UL (ref 150–450)
POTASSIUM SERPL-SCNC: 4.3 MMOL/L (ref 3.5–5.3)
PROT SERPL-MCNC: 6.8 G/DL (ref 6.4–8.2)
RBC # BLD AUTO: 4.47 X10*6/UL (ref 4.5–5.9)
SODIUM SERPL-SCNC: 140 MMOL/L (ref 136–145)
TRIGL SERPL-MCNC: 164 MG/DL (ref 0–149)
URATE SERPL-MCNC: 4.7 MG/DL (ref 4–7.5)
VLDL: 33 MG/DL (ref 0–40)
WBC # BLD AUTO: 7 X10*3/UL (ref 4.4–11.3)

## 2024-02-21 PROCEDURE — 84550 ASSAY OF BLOOD/URIC ACID: CPT

## 2024-02-21 PROCEDURE — 80053 COMPREHEN METABOLIC PANEL: CPT

## 2024-02-21 PROCEDURE — 80061 LIPID PANEL: CPT

## 2024-02-21 PROCEDURE — 36415 COLL VENOUS BLD VENIPUNCTURE: CPT

## 2024-02-21 PROCEDURE — 85027 COMPLETE CBC AUTOMATED: CPT

## 2024-02-21 NOTE — TELEPHONE ENCOUNTER
T/c to pt, spoke with pt, informed pt of lab results, he understands and has no questions at this time

## 2024-02-21 NOTE — TELEPHONE ENCOUNTER
----- Message from Carlos Eduardo Han MD sent at 2/21/2024 12:43 PM EST -----  Labs normal.  Kidney function abnormal but stable

## 2024-03-08 DIAGNOSIS — G47.9 SLEEP DISORDER: ICD-10-CM

## 2024-03-08 RX ORDER — ZOLPIDEM TARTRATE 5 MG/1
5 TABLET ORAL NIGHTLY PRN
Qty: 30 TABLET | Refills: 0 | Status: SHIPPED | OUTPATIENT
Start: 2024-03-08

## 2024-07-15 ENCOUNTER — LAB (OUTPATIENT)
Dept: LAB | Facility: LAB | Age: 79
End: 2024-07-15
Payer: MEDICARE

## 2024-07-15 DIAGNOSIS — Z12.5 ENCOUNTER FOR SCREENING FOR MALIGNANT NEOPLASM OF PROSTATE: Primary | ICD-10-CM

## 2024-07-17 LAB
PSA FREE MFR SERPL: 29 %
PSA FREE SERPL-MCNC: 0.2 NG/ML
PSA SERPL IA-MCNC: 0.7 NG/ML (ref 0–4)

## 2024-08-12 DIAGNOSIS — I10 ESSENTIAL HYPERTENSION: ICD-10-CM

## 2024-08-14 RX ORDER — HYDROCHLOROTHIAZIDE 12.5 MG/1
12.5 TABLET ORAL DAILY
Qty: 90 TABLET | Refills: 1 | Status: SHIPPED | OUTPATIENT
Start: 2024-08-14

## 2024-08-19 ENCOUNTER — APPOINTMENT (OUTPATIENT)
Dept: PRIMARY CARE | Facility: CLINIC | Age: 79
End: 2024-08-19
Payer: MEDICARE

## 2024-08-19 VITALS
HEART RATE: 80 BPM | HEIGHT: 73 IN | WEIGHT: 196 LBS | TEMPERATURE: 97.3 F | SYSTOLIC BLOOD PRESSURE: 119 MMHG | BODY MASS INDEX: 25.98 KG/M2 | DIASTOLIC BLOOD PRESSURE: 66 MMHG

## 2024-08-19 DIAGNOSIS — E66.3 OVERWEIGHT WITH BODY MASS INDEX (BMI) OF 25 TO 25.9 IN ADULT: ICD-10-CM

## 2024-08-19 DIAGNOSIS — I10 ESSENTIAL HYPERTENSION: ICD-10-CM

## 2024-08-19 DIAGNOSIS — E79.0 HYPERURICEMIA: ICD-10-CM

## 2024-08-19 DIAGNOSIS — Z87.891 FORMER SMOKER: ICD-10-CM

## 2024-08-19 DIAGNOSIS — N28.9 RENAL INSUFFICIENCY: ICD-10-CM

## 2024-08-19 DIAGNOSIS — J34.89 RHINORRHEA: ICD-10-CM

## 2024-08-19 DIAGNOSIS — I48.91 ATRIAL FIBRILLATION, UNSPECIFIED TYPE (MULTI): ICD-10-CM

## 2024-08-19 DIAGNOSIS — E78.5 HYPERLIPIDEMIA, UNSPECIFIED HYPERLIPIDEMIA TYPE: ICD-10-CM

## 2024-08-19 DIAGNOSIS — G47.9 SLEEP DISORDER: ICD-10-CM

## 2024-08-19 PROCEDURE — 99213 OFFICE O/P EST LOW 20 MIN: CPT | Performed by: FAMILY MEDICINE

## 2024-08-19 PROCEDURE — 3078F DIAST BP <80 MM HG: CPT | Performed by: FAMILY MEDICINE

## 2024-08-19 PROCEDURE — 1036F TOBACCO NON-USER: CPT | Performed by: FAMILY MEDICINE

## 2024-08-19 PROCEDURE — 1123F ACP DISCUSS/DSCN MKR DOCD: CPT | Performed by: FAMILY MEDICINE

## 2024-08-19 PROCEDURE — 3074F SYST BP LT 130 MM HG: CPT | Performed by: FAMILY MEDICINE

## 2024-08-19 PROCEDURE — 1158F ADVNC CARE PLAN TLK DOCD: CPT | Performed by: FAMILY MEDICINE

## 2024-08-19 PROCEDURE — 1160F RVW MEDS BY RX/DR IN RCRD: CPT | Performed by: FAMILY MEDICINE

## 2024-08-19 PROCEDURE — 1159F MED LIST DOCD IN RCRD: CPT | Performed by: FAMILY MEDICINE

## 2024-08-19 RX ORDER — LOSARTAN POTASSIUM 50 MG/1
50 TABLET ORAL DAILY
Qty: 90 TABLET | Refills: 1 | Status: SHIPPED | OUTPATIENT
Start: 2024-08-19

## 2024-08-19 RX ORDER — SIMVASTATIN 40 MG/1
40 TABLET, FILM COATED ORAL NIGHTLY
Qty: 90 TABLET | Refills: 1 | Status: SHIPPED | OUTPATIENT
Start: 2024-08-19

## 2024-08-19 RX ORDER — HYDROCHLOROTHIAZIDE 12.5 MG/1
12.5 TABLET ORAL DAILY
Qty: 90 TABLET | Refills: 1 | Status: SHIPPED | OUTPATIENT
Start: 2024-08-19

## 2024-08-19 RX ORDER — RIVAROXABAN 20 MG/1
20 TABLET, FILM COATED ORAL DAILY
Qty: 90 TABLET | Refills: 1 | Status: SHIPPED | OUTPATIENT
Start: 2024-08-19

## 2024-08-19 RX ORDER — METOPROLOL TARTRATE 50 MG/1
50 TABLET ORAL EVERY 12 HOURS
Qty: 180 TABLET | Refills: 1 | Status: SHIPPED | OUTPATIENT
Start: 2024-08-19

## 2024-08-19 RX ORDER — FEBUXOSTAT 40 MG/1
40 TABLET, FILM COATED ORAL DAILY
Qty: 90 TABLET | Refills: 1 | Status: SHIPPED | OUTPATIENT
Start: 2024-08-19

## 2024-08-19 ASSESSMENT — PATIENT HEALTH QUESTIONNAIRE - PHQ9
SUM OF ALL RESPONSES TO PHQ9 QUESTIONS 1 AND 2: 0
2. FEELING DOWN, DEPRESSED OR HOPELESS: NOT AT ALL
1. LITTLE INTEREST OR PLEASURE IN DOING THINGS: NOT AT ALL

## 2024-08-19 ASSESSMENT — ENCOUNTER SYMPTOMS
DIARRHEA: 1
MUSCULOSKELETAL NEGATIVE: 1
CONSTITUTIONAL NEGATIVE: 1
CARDIOVASCULAR NEGATIVE: 1
RESPIRATORY NEGATIVE: 1
EYES NEGATIVE: 1
HEMATOLOGIC/LYMPHATIC NEGATIVE: 1
ENDOCRINE NEGATIVE: 1
ALLERGIC/IMMUNOLOGIC NEGATIVE: 1
PSYCHIATRIC NEGATIVE: 1

## 2024-08-19 NOTE — PROGRESS NOTES
Elva Tejada is here for follow-up on hypertension and hyperlipidemia.  Patient states that he has been overall doing well.  He did have a episode of diarrhea this morning after arising but has not had any further episodes yet today.  No nausea or vomiting.  He continues on his meds noted.  He sees Dr. Uribe every 6 months for cardiology care.  His carotid duplex scans are up-to-date.  He has no other complaints at the present time.    Patient ID: Terrell Malhotra is a 78 y.o. male who presents for Follow-up and Med Refill (Refills on all meds):    Problem List Items Addressed This Visit    None     Past Medical History:   Diagnosis Date    Body mass index (BMI) 26.0-26.9, adult     BMI 26.0-26.9,adult    Difficulty in walking, not elsewhere classified 10/15/2021    Impaired ambulation    History of falling     Status post fall    Muscle weakness (generalized) 10/15/2021    Generalized muscle weakness    Overweight     Overweight    Personal history of other specified conditions 01/02/2019    History of epistaxis    Personal history of other specified conditions 10/15/2021    History of gait disorder    Personal history of other specified conditions 10/15/2021    History of balance disorder    Personal history of other specified conditions 10/15/2021    History of unsteady gait    Repeated falls 10/15/2021    Repeated falls    Unspecified fracture of lower end of unspecified humerus, initial encounter for closed fracture     Elbow fracture      Past Surgical History:   Procedure Laterality Date    OTHER SURGICAL HISTORY  11/29/2021    Carotid artery reconstruction    OTHER SURGICAL HISTORY  11/29/2021    Carpal tunnel surgery    OTHER SURGICAL HISTORY  11/29/2021    Cataract surgery    OTHER SURGICAL HISTORY  11/29/2021    Endarterectomy    OTHER SURGICAL HISTORY  11/29/2021    Hernia repair    OTHER SURGICAL HISTORY  11/29/2021    Inguinal hernia repair    OTHER SURGICAL HISTORY  11/29/2021    Percutaneous  transluminal coronary angioplasty    OTHER SURGICAL HISTORY  11/29/2021    Surgery    OTHER SURGICAL HISTORY  11/29/2021    Shoulder surgery    OTHER SURGICAL HISTORY  11/29/2021    Perianal abscess incision and drainage    OTHER SURGICAL HISTORY  11/29/2021    Foot surgery    OTHER SURGICAL HISTORY  11/29/2021    Elbow surgery    OTHER SURGICAL HISTORY  02/07/2022    Colonoscopy      Family History   Problem Relation Name Age of Onset    Cancer Mother      Stroke Mother        Social History     Socioeconomic History    Marital status:      Spouse name: Not on file    Number of children: Not on file    Years of education: Not on file    Highest education level: Not on file   Occupational History    Not on file   Tobacco Use    Smoking status: Former     Types: Cigarettes    Smokeless tobacco: Never   Substance and Sexual Activity    Alcohol use: Yes     Alcohol/week: 7.0 standard drinks of alcohol     Types: 7 Standard drinks or equivalent per week    Drug use: Not Currently    Sexual activity: Not on file   Other Topics Concern    Not on file   Social History Narrative    Not on file     Social Determinants of Health     Financial Resource Strain: Not on file   Food Insecurity: Not on file   Transportation Needs: Not on file   Physical Activity: Not on file   Stress: Not on file   Social Connections: Not on file   Intimate Partner Violence: Not on file   Housing Stability: Not on file      Allopurinol and Lisinopril   Current Outpatient Medications   Medication Sig Dispense Refill    aspirin 81 mg EC tablet Take 1 tablet (81 mg) by mouth once daily.      calcium carbonate-vitamin D3 600 mg-5 mcg (200 unit) tablet Take 1 tablet by mouth once daily.      docosahexaenoic acid/epa (FISH OIL ORAL) Take 1 capsule by mouth once daily.      febuxostat (Uloric) 40 mg tablet Take 1 tablet (40 mg) by mouth once daily. 90 tablet 1    hydroCHLOROthiazide (Microzide) 12.5 mg tablet TAKE 1 TABLET DAILY 90 tablet 1     losartan (Cozaar) 50 mg tablet Take 1 tablet (50 mg) by mouth once daily. 90 tablet 1    metoprolol tartrate (Lopressor) 50 mg tablet Take 1 tablet by mouth every 12 hours. 180 tablet 1    simvastatin (Zocor) 40 mg tablet Take 1 tablet (40 mg) by mouth once daily at bedtime. 90 tablet 1    Xarelto 20 mg tablet Take 1 tablet (20 mg) by mouth once daily.      zolpidem (Ambien) 5 mg tablet Take 1 tablet (5 mg) by mouth as needed at bedtime for sleep. 30 tablet 0    celecoxib (CeleBREX) 200 mg capsule        No current facility-administered medications for this visit.       Immunization History   Administered Date(s) Administered    Flu vaccine, quadrivalent, high-dose, preservative free, age 65y+ (FLUZONE) 10/20/2020    Flu vaccine, trivalent, preservative free, HIGH-DOSE, age 65y+ (Fluzone) 11/13/2014, 11/08/2017, 10/12/2018, 09/28/2019, 10/20/2020, 11/17/2021, 10/26/2022    Influenza, Unspecified 11/01/2007, 11/11/2008, 10/14/2010, 09/02/2012, 09/01/2015, 09/01/2016, 11/17/2021, 10/26/2022    Novel influenza-H1N1-09, preservative-free 12/13/2009    Pfizer COVID-19 vaccine, Fall 2023, 12 years and older, (30mcg/0.3mL) 10/24/2023    Pfizer COVID-19 vaccine, bivalent, age 12 years and older (30 mcg/0.3 mL) 09/30/2022    Pfizer Purple Cap SARS-CoV-2 02/05/2021, 03/05/2021, 04/08/2021, 10/07/2021, 09/27/2022    Pneumococcal conjugate vaccine, 13-valent (PREVNAR 13) 02/25/2016    Pneumococcal polysaccharide vaccine, 23-valent, age 2 years and older (PNEUMOVAX 23) 01/10/2013, 08/22/2018, 10/20/2021    Tdap vaccine, age 7 year and older (BOOSTRIX, ADACEL) 02/23/2018    Zoster vaccine, recombinant, adult (SHINGRIX) 08/30/2019, 12/31/2019, 02/01/2020    Zoster, live 08/24/2011        Review of Systems   Constitutional: Negative.    HENT: Negative.     Eyes: Negative.    Respiratory: Negative.     Cardiovascular: Negative.    Gastrointestinal:  Positive for diarrhea.   Endocrine: Negative.    Genitourinary: Negative.     Musculoskeletal: Negative.    Skin: Negative.    Allergic/Immunologic: Negative.    Hematological: Negative.    Psychiatric/Behavioral: Negative.     All other systems reviewed and are negative.       Vitals:    08/19/24 1103   BP: 119/66   Pulse: 80   Temp: 36.3 °C (97.3 °F)     Vitals:    08/19/24 1103   Weight: 88.9 kg (196 lb)      Physical Exam  Constitutional:       General: He is not in acute distress.     Appearance: Normal appearance.   Cardiovascular:      Rate and Rhythm: Normal rate and regular rhythm.      Pulses: Normal pulses.      Heart sounds: Normal heart sounds. No murmur heard.     No friction rub. No gallop.   Pulmonary:      Effort: Pulmonary effort is normal. No respiratory distress.      Breath sounds: Normal breath sounds. No wheezing or rales.   Neurological:      Mental Status: He is alert.          ASSESSMENT/PLAN: Hypertension stable.  Continue current meds noted    Hyperlipidemia.  Continue simvastatin daily.    Chronic kidney disease.  Check CMP.    Coronary artery disease followed by cardiology.    Atrial fibrillation followed by cardiology on chronic anticoagulation    Continue current medications  Recommended RSV vaccination  Colonoscopy due in 2025  Call if diarrhea not resolved in the next several days.  Follow-up in 6 months and call as needed       Scribe Attestation  By signing my name below, I, Stella Donovan LPN, Scribe   attest that this documentation has been prepared under the direction and in the presence of Carlos Eduardo Han MD.

## 2024-08-20 ENCOUNTER — TELEMEDICINE (OUTPATIENT)
Dept: PRIMARY CARE | Facility: CLINIC | Age: 79
End: 2024-08-20
Payer: MEDICARE

## 2024-08-20 DIAGNOSIS — U07.1 COVID-19 VIRUS INFECTION: Primary | ICD-10-CM

## 2024-08-20 PROCEDURE — 1123F ACP DISCUSS/DSCN MKR DOCD: CPT | Performed by: INTERNAL MEDICINE

## 2024-08-20 PROCEDURE — 99441 PR PHYS/QHP TELEPHONE EVALUATION 5-10 MIN: CPT | Performed by: INTERNAL MEDICINE

## 2024-08-20 NOTE — PROGRESS NOTES
Assessment/Plan   Problem List Items Addressed This Visit       COVID-19 virus infection - Primary   On review we discussed the use of the medication he says that he is doing well and in view of that it was agreed upon by the patient that no treatment will be appropriate course and watch and use symptom control measures if needed or as needed  Follow-up as needed  Total time on the telephone conversation and discussion 5 minutes    Subjective   Patient ID: Terrell Malhotra is a 78 y.o. male who presents for Cough (COVID positive with home test this am ).    Past Surgical History:   Procedure Laterality Date    OTHER SURGICAL HISTORY  11/29/2021    Carotid artery reconstruction    OTHER SURGICAL HISTORY  11/29/2021    Carpal tunnel surgery    OTHER SURGICAL HISTORY  11/29/2021    Cataract surgery    OTHER SURGICAL HISTORY  11/29/2021    Endarterectomy    OTHER SURGICAL HISTORY  11/29/2021    Hernia repair    OTHER SURGICAL HISTORY  11/29/2021    Inguinal hernia repair    OTHER SURGICAL HISTORY  11/29/2021    Percutaneous transluminal coronary angioplasty    OTHER SURGICAL HISTORY  11/29/2021    Surgery    OTHER SURGICAL HISTORY  11/29/2021    Shoulder surgery    OTHER SURGICAL HISTORY  11/29/2021    Perianal abscess incision and drainage    OTHER SURGICAL HISTORY  11/29/2021    Foot surgery    OTHER SURGICAL HISTORY  11/29/2021    Elbow surgery    OTHER SURGICAL HISTORY  02/07/2022    Colonoscopy      Family History   Problem Relation Name Age of Onset    Cancer Mother      Stroke Mother        Social History     Socioeconomic History    Marital status:      Spouse name: Not on file    Number of children: Not on file    Years of education: Not on file    Highest education level: Not on file   Occupational History    Not on file   Tobacco Use    Smoking status: Former     Types: Cigarettes    Smokeless tobacco: Never   Substance and Sexual Activity    Alcohol use: Yes     Alcohol/week: 7.0 standard drinks of  alcohol     Types: 7 Standard drinks or equivalent per week    Drug use: Not Currently    Sexual activity: Not on file   Other Topics Concern    Not on file   Social History Narrative    Not on file     Social Determinants of Health     Financial Resource Strain: Not on file   Food Insecurity: Not on file   Transportation Needs: Not on file   Physical Activity: Not on file   Stress: Not on file   Social Connections: Not on file   Intimate Partner Violence: Not on file   Housing Stability: Not on file      Allopurinol and Lisinopril   Current Outpatient Medications   Medication Sig Dispense Refill    aspirin 81 mg EC tablet Take 1 tablet (81 mg) by mouth once daily.      calcium carbonate-vitamin D3 600 mg-5 mcg (200 unit) tablet Take 1 tablet by mouth once daily.      docosahexaenoic acid/epa (FISH OIL ORAL) Take 1 capsule by mouth once daily.      febuxostat (Uloric) 40 mg tablet Take 1 tablet (40 mg) by mouth once daily. 90 tablet 1    hydroCHLOROthiazide (Microzide) 12.5 mg tablet Take 1 tablet (12.5 mg) by mouth once daily. 90 tablet 1    losartan (Cozaar) 50 mg tablet Take 1 tablet (50 mg) by mouth once daily. 90 tablet 1    metoprolol tartrate (Lopressor) 50 mg tablet Take 1 tablet by mouth every 12 hours. 180 tablet 1    simvastatin (Zocor) 40 mg tablet Take 1 tablet (40 mg) by mouth once daily at bedtime. 90 tablet 1    Xarelto 20 mg tablet Take 1 tablet (20 mg) by mouth once daily. 90 tablet 1    zolpidem (Ambien) 5 mg tablet Take 1 tablet (5 mg) by mouth as needed at bedtime for sleep. 30 tablet 0     No current facility-administered medications for this visit.      There were no vitals filed for this visit.   Problem List Items Addressed This Visit       COVID-19 virus infection - Primary      No orders of the defined types were placed in this encounter.       HPI  This is a 78-year-old gentleman who presented on the telephone he had some loose bowel movement and he has some cold feeling advised to have a  "COVID testing and COVID testing was positive  It is interesting they were in the family reunion in [place] and on 18th his wife started feeling cold and as if she is getting sick with the chills and they drove back and she got positive on COVID testing yesterday  He also had some concern of the probable lose motion and he was advised that he should have a COVID test which was done today and positive  He feels otherwise well    ROS    PHYSICAL EXAM  Examination limited by the telephone conversation no distress comfortable awake alert orientated        No results found for: \"PR1\", \"BMPR1A\", \"CMPLAS\", \"ZX7QXPDJ\", \"KPSAT\"   Lab Results   Component Value Date    CHOL 140 02/21/2024    LDLCALC 61 02/21/2024    CHHDL 3.0 02/21/2024                "

## 2024-09-03 ENCOUNTER — LAB (OUTPATIENT)
Dept: LAB | Facility: LAB | Age: 79
End: 2024-09-03
Payer: MEDICARE

## 2024-09-03 DIAGNOSIS — I10 ESSENTIAL HYPERTENSION: ICD-10-CM

## 2024-09-03 DIAGNOSIS — E79.0 HYPERURICEMIA: ICD-10-CM

## 2024-09-03 DIAGNOSIS — N28.9 RENAL INSUFFICIENCY: ICD-10-CM

## 2024-09-03 DIAGNOSIS — E66.3 OVERWEIGHT WITH BODY MASS INDEX (BMI) OF 25 TO 25.9 IN ADULT: ICD-10-CM

## 2024-09-03 DIAGNOSIS — E78.5 HYPERLIPIDEMIA, UNSPECIFIED HYPERLIPIDEMIA TYPE: ICD-10-CM

## 2024-09-03 LAB
ALBUMIN SERPL BCP-MCNC: 3.8 G/DL (ref 3.4–5)
ALP SERPL-CCNC: 66 U/L (ref 33–136)
ALT SERPL W P-5'-P-CCNC: 18 U/L (ref 10–52)
ANION GAP SERPL CALC-SCNC: 12 MMOL/L (ref 10–20)
AST SERPL W P-5'-P-CCNC: 18 U/L (ref 9–39)
BILIRUB SERPL-MCNC: 0.7 MG/DL (ref 0–1.2)
BUN SERPL-MCNC: 21 MG/DL (ref 6–23)
CALCIUM SERPL-MCNC: 9 MG/DL (ref 8.6–10.3)
CHLORIDE SERPL-SCNC: 102 MMOL/L (ref 98–107)
CO2 SERPL-SCNC: 28 MMOL/L (ref 21–32)
CREAT SERPL-MCNC: 1.42 MG/DL (ref 0.5–1.3)
EGFRCR SERPLBLD CKD-EPI 2021: 51 ML/MIN/1.73M*2
GLUCOSE SERPL-MCNC: 136 MG/DL (ref 74–99)
POTASSIUM SERPL-SCNC: 3.8 MMOL/L (ref 3.5–5.3)
PROT SERPL-MCNC: 6.5 G/DL (ref 6.4–8.2)
SODIUM SERPL-SCNC: 138 MMOL/L (ref 136–145)

## 2024-09-03 PROCEDURE — 80053 COMPREHEN METABOLIC PANEL: CPT

## 2024-09-03 PROCEDURE — 36415 COLL VENOUS BLD VENIPUNCTURE: CPT

## 2024-09-09 ENCOUNTER — APPOINTMENT (OUTPATIENT)
Dept: PRIMARY CARE | Facility: CLINIC | Age: 79
End: 2024-09-09
Payer: MEDICARE

## 2024-09-09 ENCOUNTER — HOSPITAL ENCOUNTER (OUTPATIENT)
Dept: RADIOLOGY | Facility: HOSPITAL | Age: 79
Discharge: HOME | End: 2024-09-09
Payer: MEDICARE

## 2024-09-09 VITALS
HEART RATE: 70 BPM | SYSTOLIC BLOOD PRESSURE: 121 MMHG | HEIGHT: 73 IN | TEMPERATURE: 97 F | BODY MASS INDEX: 26.14 KG/M2 | DIASTOLIC BLOOD PRESSURE: 70 MMHG | WEIGHT: 197.2 LBS

## 2024-09-09 DIAGNOSIS — R05.1 ACUTE COUGH: ICD-10-CM

## 2024-09-09 DIAGNOSIS — I25.10 CORONARY ARTERY DISEASE INVOLVING NATIVE HEART, UNSPECIFIED VESSEL OR LESION TYPE, UNSPECIFIED WHETHER ANGINA PRESENT: Primary | ICD-10-CM

## 2024-09-09 PROCEDURE — 71046 X-RAY EXAM CHEST 2 VIEWS: CPT | Performed by: RADIOLOGY

## 2024-09-09 PROCEDURE — 1160F RVW MEDS BY RX/DR IN RCRD: CPT | Performed by: FAMILY MEDICINE

## 2024-09-09 PROCEDURE — 1036F TOBACCO NON-USER: CPT | Performed by: FAMILY MEDICINE

## 2024-09-09 PROCEDURE — 1123F ACP DISCUSS/DSCN MKR DOCD: CPT | Performed by: FAMILY MEDICINE

## 2024-09-09 PROCEDURE — 99213 OFFICE O/P EST LOW 20 MIN: CPT | Performed by: FAMILY MEDICINE

## 2024-09-09 PROCEDURE — 3074F SYST BP LT 130 MM HG: CPT | Performed by: FAMILY MEDICINE

## 2024-09-09 PROCEDURE — 3078F DIAST BP <80 MM HG: CPT | Performed by: FAMILY MEDICINE

## 2024-09-09 PROCEDURE — 71046 X-RAY EXAM CHEST 2 VIEWS: CPT

## 2024-09-09 PROCEDURE — 1159F MED LIST DOCD IN RCRD: CPT | Performed by: FAMILY MEDICINE

## 2024-09-09 RX ORDER — NITROGLYCERIN 0.4 MG/1
0.4 TABLET SUBLINGUAL EVERY 5 MIN PRN
Qty: 30 TABLET | Refills: 1 | Status: SHIPPED | OUTPATIENT
Start: 2024-09-09 | End: 2025-09-09

## 2024-09-11 ENCOUNTER — TELEPHONE (OUTPATIENT)
Dept: PRIMARY CARE | Facility: CLINIC | Age: 79
End: 2024-09-11
Payer: MEDICARE

## 2024-09-11 ASSESSMENT — ENCOUNTER SYMPTOMS
CARDIOVASCULAR NEGATIVE: 1
CONSTITUTIONAL NEGATIVE: 1
ALLERGIC/IMMUNOLOGIC NEGATIVE: 1
EYES NEGATIVE: 1
ENDOCRINE NEGATIVE: 1
HEMATOLOGIC/LYMPHATIC NEGATIVE: 1
COUGH: 1
PSYCHIATRIC NEGATIVE: 1
GASTROINTESTINAL NEGATIVE: 1
MUSCULOSKELETAL NEGATIVE: 1

## 2024-09-11 NOTE — TELEPHONE ENCOUNTER
----- Message from Carlos Eduardo Han sent at 9/11/2024  7:50 AM EDT -----  CXR normal.  Tell pt to sched OV one month for recheck

## 2024-09-11 NOTE — PROGRESS NOTES
Subjective Terrell is here for a follow-up COVID.  The patient was diagnosed with COVID 3 weeks ago.  At that time he had mild symptoms of nasal congestion and cough.  He had a slight fever.  He does have a chronic cough.  He did not have any shortness of breath.  He recovered well and at this point has only a slight nagging cough.  His son who is a nurse listen to his lungs and thought he heard some fluid in the left lower lobe about 10 days ago.  At this point the patient has no shortness of breath.  He also does note some GERD symptoms on occasion and heartburn.  Pepcid does help relieve his symptoms.  He continues on his meds noted.  He has no other complaints at the present time.    Patient ID: Terrell Malhotra is a 78 y.o. male who presents for Follow-up (Pt had covid, pts son is a nurse and listen to his lungs and says there may be some fluid):    Problem List Items Addressed This Visit       CAD (coronary artery disease) - Primary    Relevant Medications    nitroglycerin (Nitrostat) 0.4 mg SL tablet     Other Visit Diagnoses       Acute cough        Relevant Orders    XR chest 2 views (Completed)           Past Medical History:   Diagnosis Date    Body mass index (BMI) 26.0-26.9, adult     BMI 26.0-26.9,adult    Difficulty in walking, not elsewhere classified 10/15/2021    Impaired ambulation    History of falling     Status post fall    Muscle weakness (generalized) 10/15/2021    Generalized muscle weakness    Overweight     Overweight    Personal history of other specified conditions 01/02/2019    History of epistaxis    Personal history of other specified conditions 10/15/2021    History of gait disorder    Personal history of other specified conditions 10/15/2021    History of balance disorder    Personal history of other specified conditions 10/15/2021    History of unsteady gait    Repeated falls 10/15/2021    Repeated falls    Unspecified fracture of lower end of unspecified humerus, initial encounter for  closed fracture     Elbow fracture      Past Surgical History:   Procedure Laterality Date    OTHER SURGICAL HISTORY  11/29/2021    Carotid artery reconstruction    OTHER SURGICAL HISTORY  11/29/2021    Carpal tunnel surgery    OTHER SURGICAL HISTORY  11/29/2021    Cataract surgery    OTHER SURGICAL HISTORY  11/29/2021    Endarterectomy    OTHER SURGICAL HISTORY  11/29/2021    Hernia repair    OTHER SURGICAL HISTORY  11/29/2021    Inguinal hernia repair    OTHER SURGICAL HISTORY  11/29/2021    Percutaneous transluminal coronary angioplasty    OTHER SURGICAL HISTORY  11/29/2021    Surgery    OTHER SURGICAL HISTORY  11/29/2021    Shoulder surgery    OTHER SURGICAL HISTORY  11/29/2021    Perianal abscess incision and drainage    OTHER SURGICAL HISTORY  11/29/2021    Foot surgery    OTHER SURGICAL HISTORY  11/29/2021    Elbow surgery    OTHER SURGICAL HISTORY  02/07/2022    Colonoscopy      Family History   Problem Relation Name Age of Onset    Cancer Mother      Stroke Mother        Social History     Socioeconomic History    Marital status:      Spouse name: Not on file    Number of children: Not on file    Years of education: Not on file    Highest education level: Not on file   Occupational History    Not on file   Tobacco Use    Smoking status: Former     Types: Cigarettes    Smokeless tobacco: Never   Substance and Sexual Activity    Alcohol use: Yes     Alcohol/week: 7.0 standard drinks of alcohol     Types: 7 Standard drinks or equivalent per week    Drug use: Not Currently    Sexual activity: Not on file   Other Topics Concern    Not on file   Social History Narrative    Not on file     Social Determinants of Health     Financial Resource Strain: Not on file   Food Insecurity: Not on file   Transportation Needs: Not on file   Physical Activity: Not on file   Stress: Not on file   Social Connections: Not on file   Intimate Partner Violence: Not on file   Housing Stability: Not on file      Allopurinol and  Lisinopril   Current Outpatient Medications   Medication Sig Dispense Refill    aspirin 81 mg EC tablet Take 1 tablet (81 mg) by mouth once daily.      calcium carbonate-vitamin D3 600 mg-5 mcg (200 unit) tablet Take 1 tablet by mouth once daily.      docosahexaenoic acid/epa (FISH OIL ORAL) Take 1 capsule by mouth once daily.      febuxostat (Uloric) 40 mg tablet Take 1 tablet (40 mg) by mouth once daily. 90 tablet 1    hydroCHLOROthiazide (Microzide) 12.5 mg tablet Take 1 tablet (12.5 mg) by mouth once daily. 90 tablet 1    losartan (Cozaar) 50 mg tablet Take 1 tablet (50 mg) by mouth once daily. 90 tablet 1    metoprolol tartrate (Lopressor) 50 mg tablet Take 1 tablet by mouth every 12 hours. 180 tablet 1    simvastatin (Zocor) 40 mg tablet Take 1 tablet (40 mg) by mouth once daily at bedtime. 90 tablet 1    Xarelto 20 mg tablet Take 1 tablet (20 mg) by mouth once daily. 90 tablet 1    zolpidem (Ambien) 5 mg tablet Take 1 tablet (5 mg) by mouth as needed at bedtime for sleep. 30 tablet 0    nitroglycerin (Nitrostat) 0.4 mg SL tablet Place 1 tablet (0.4 mg) under the tongue every 5 minutes if needed for chest pain. May repeat every 5 minutes for up to 3 doses. 30 tablet 1     No current facility-administered medications for this visit.       Immunization History   Administered Date(s) Administered    Flu vaccine, quadrivalent, high-dose, preservative free, age 65y+ (FLUZONE) 10/20/2020    Flu vaccine, trivalent, preservative free, HIGH-DOSE, age 65y+ (Fluzone) 11/13/2014, 11/08/2017, 10/12/2018, 09/28/2019, 10/20/2020, 11/17/2021, 10/26/2022    Influenza, Unspecified 11/01/2007, 11/11/2008, 10/14/2010, 09/02/2012, 09/01/2015, 09/01/2016, 11/17/2021, 10/26/2022    Novel influenza-H1N1-09, preservative-free 12/13/2009    Pfizer COVID-19 vaccine, Fall 2023, 12 years and older, (30mcg/0.3mL) 10/24/2023    Pfizer COVID-19 vaccine, bivalent, age 12 years and older (30 mcg/0.3 mL) 09/30/2022    Pfizer Purple Cap  SARS-CoV-2 02/05/2021, 03/05/2021, 04/08/2021, 10/07/2021, 09/27/2022    Pneumococcal conjugate vaccine, 13-valent (PREVNAR 13) 02/25/2016    Pneumococcal polysaccharide vaccine, 23-valent, age 2 years and older (PNEUMOVAX 23) 01/10/2013, 08/22/2018, 10/20/2021    Tdap vaccine, age 7 year and older (BOOSTRIX, ADACEL) 02/23/2018    Zoster vaccine, recombinant, adult (SHINGRIX) 08/30/2019, 12/31/2019, 02/01/2020    Zoster, live 08/24/2011        Review of Systems   Constitutional: Negative.    HENT: Negative.     Eyes: Negative.    Respiratory:  Positive for cough.    Cardiovascular: Negative.    Gastrointestinal: Negative.    Endocrine: Negative.    Genitourinary: Negative.    Musculoskeletal: Negative.    Skin: Negative.    Allergic/Immunologic: Negative.    Hematological: Negative.    Psychiatric/Behavioral: Negative.     All other systems reviewed and are negative.       Vitals:    09/09/24 1535   BP: 121/70   Pulse: 70   Temp: 36.1 °C (97 °F)     Vitals:    09/09/24 1535   Weight: 89.4 kg (197 lb 3.2 oz)      Physical Exam  Constitutional:       General: He is not in acute distress.     Appearance: Normal appearance. He is not ill-appearing.   Cardiovascular:      Rate and Rhythm: Normal rate and regular rhythm.      Pulses: Normal pulses.      Heart sounds: Normal heart sounds. No murmur heard.     No friction rub. No gallop.   Pulmonary:      Effort: Pulmonary effort is normal. No respiratory distress.      Breath sounds: Rales (There are very faint crackles heard in the left lower lung fields.  There is good air movement.) present. No wheezing.   Neurological:      Mental Status: He is alert.          ASSESSMENT/PLAN: Status post COVID infection resolving.  Consideration of left lower lobe pneumonia.  Check chest x-ray PA and lateral.  If chest x-ray is normal we will continue to monitor and follow-up in 1 month.  Patient currently asymptomatic except for very mild chronic cough.    Hypertension stable.   Continue current meds as noted.    Mild GERD.  May use Pepcid 20 or 40 mg daily.  Call if GERD symptoms persist.    Follow-up pending above and call as needed

## 2024-10-21 ENCOUNTER — APPOINTMENT (OUTPATIENT)
Dept: PRIMARY CARE | Facility: CLINIC | Age: 79
End: 2024-10-21
Payer: MEDICARE

## 2024-10-21 VITALS
SYSTOLIC BLOOD PRESSURE: 117 MMHG | TEMPERATURE: 97.4 F | OXYGEN SATURATION: 95 % | WEIGHT: 196 LBS | DIASTOLIC BLOOD PRESSURE: 67 MMHG | HEIGHT: 73 IN | HEART RATE: 85 BPM | BODY MASS INDEX: 25.98 KG/M2

## 2024-10-21 DIAGNOSIS — E66.3 OVERWEIGHT WITH BODY MASS INDEX (BMI) OF 25 TO 25.9 IN ADULT: ICD-10-CM

## 2024-10-21 DIAGNOSIS — U07.1 COVID-19 VIRUS INFECTION: ICD-10-CM

## 2024-10-21 DIAGNOSIS — R26.89 BALANCE DISORDER: ICD-10-CM

## 2024-10-21 DIAGNOSIS — R05.1 ACUTE COUGH: ICD-10-CM

## 2024-10-21 DIAGNOSIS — R41.3 MEMORY CHANGES: ICD-10-CM

## 2024-10-21 DIAGNOSIS — Z87.891 FORMER SMOKER: ICD-10-CM

## 2024-10-21 PROCEDURE — 99213 OFFICE O/P EST LOW 20 MIN: CPT | Performed by: FAMILY MEDICINE

## 2024-10-21 PROCEDURE — 3078F DIAST BP <80 MM HG: CPT | Performed by: FAMILY MEDICINE

## 2024-10-21 PROCEDURE — 1159F MED LIST DOCD IN RCRD: CPT | Performed by: FAMILY MEDICINE

## 2024-10-21 PROCEDURE — 1123F ACP DISCUSS/DSCN MKR DOCD: CPT | Performed by: FAMILY MEDICINE

## 2024-10-21 PROCEDURE — 1036F TOBACCO NON-USER: CPT | Performed by: FAMILY MEDICINE

## 2024-10-21 PROCEDURE — 3074F SYST BP LT 130 MM HG: CPT | Performed by: FAMILY MEDICINE

## 2024-10-21 PROCEDURE — 1160F RVW MEDS BY RX/DR IN RCRD: CPT | Performed by: FAMILY MEDICINE

## 2024-10-21 ASSESSMENT — PATIENT HEALTH QUESTIONNAIRE - PHQ9
2. FEELING DOWN, DEPRESSED OR HOPELESS: NOT AT ALL
1. LITTLE INTEREST OR PLEASURE IN DOING THINGS: NOT AT ALL
SUM OF ALL RESPONSES TO PHQ9 QUESTIONS 1 AND 2: 0

## 2024-10-21 NOTE — PROGRESS NOTES
Subjective Terrell is here with several concerns.  Primarily he is here for a follow-up on his COVID and for infection and chest x-ray results.  He states that at this point he is feeling well and has had no persistent coughing or shortness of breath.  He is inquiring if he should continue the hydrochlorothiazide 12.5 mg daily.  He is having no side effects with this medication.  He has had a mole on his upper back for several years.  His wife says that it has not been changing or getting larger.  He also notes some difficulty remembering people's names but no other cognitive changes that he relates.  He continues on his meds noted    Patient ID: Terrell Malhotra is a 79 y.o. male who presents for Follow-up (Pneumonia follow up/Questions about diuretic./Question about mole on back/Memory issues):    Problem List Items Addressed This Visit    None     Past Medical History:   Diagnosis Date    Body mass index (BMI) 26.0-26.9, adult     BMI 26.0-26.9,adult    Difficulty in walking, not elsewhere classified 10/15/2021    Impaired ambulation    History of falling     Status post fall    Muscle weakness (generalized) 10/15/2021    Generalized muscle weakness    Overweight     Overweight    Personal history of other specified conditions 01/02/2019    History of epistaxis    Personal history of other specified conditions 10/15/2021    History of gait disorder    Personal history of other specified conditions 10/15/2021    History of balance disorder    Personal history of other specified conditions 10/15/2021    History of unsteady gait    Repeated falls 10/15/2021    Repeated falls    Unspecified fracture of lower end of unspecified humerus, initial encounter for closed fracture     Elbow fracture      Past Surgical History:   Procedure Laterality Date    OTHER SURGICAL HISTORY  11/29/2021    Carotid artery reconstruction    OTHER SURGICAL HISTORY  11/29/2021    Carpal tunnel surgery    OTHER SURGICAL HISTORY  11/29/2021     Cataract surgery    OTHER SURGICAL HISTORY  2021    Endarterectomy    OTHER SURGICAL HISTORY  2021    Hernia repair    OTHER SURGICAL HISTORY  2021    Inguinal hernia repair    OTHER SURGICAL HISTORY  2021    Percutaneous transluminal coronary angioplasty    OTHER SURGICAL HISTORY  2021    Surgery    OTHER SURGICAL HISTORY  2021    Shoulder surgery    OTHER SURGICAL HISTORY  2021    Perianal abscess incision and drainage    OTHER SURGICAL HISTORY  2021    Foot surgery    OTHER SURGICAL HISTORY  2021    Elbow surgery    OTHER SURGICAL HISTORY  2022    Colonoscopy      Family History   Problem Relation Name Age of Onset    Cancer Mother      Stroke Mother        Social History     Socioeconomic History    Marital status:      Spouse name: Not on file    Number of children: Not on file    Years of education: Not on file    Highest education level: Not on file   Occupational History    Not on file   Tobacco Use    Smoking status: Former     Current packs/day: 0.00     Types: Cigarettes     Quit date:      Years since quittin.8    Smokeless tobacco: Never   Substance and Sexual Activity    Alcohol use: Yes     Alcohol/week: 7.0 standard drinks of alcohol     Types: 7 Standard drinks or equivalent per week    Drug use: Not Currently    Sexual activity: Not on file   Other Topics Concern    Not on file   Social History Narrative    Not on file     Social Drivers of Health     Financial Resource Strain: Not on file   Food Insecurity: Not on file   Transportation Needs: Not on file   Physical Activity: Not on file   Stress: Not on file   Social Connections: Not on file   Intimate Partner Violence: Not on file   Housing Stability: Not on file      Allopurinol and Lisinopril   Current Outpatient Medications   Medication Sig Dispense Refill    aspirin 81 mg EC tablet Take 1 tablet (81 mg) by mouth once daily.      calcium carbonate-vitamin D3 600 mg-5  mcg (200 unit) tablet Take 1 tablet by mouth once daily.      docosahexaenoic acid/epa (FISH OIL ORAL) Take 1 capsule by mouth once daily.      febuxostat (Uloric) 40 mg tablet Take 1 tablet (40 mg) by mouth once daily. 90 tablet 1    hydroCHLOROthiazide (Microzide) 12.5 mg tablet Take 1 tablet (12.5 mg) by mouth once daily. 90 tablet 1    losartan (Cozaar) 50 mg tablet Take 1 tablet (50 mg) by mouth once daily. 90 tablet 1    metoprolol tartrate (Lopressor) 50 mg tablet Take 1 tablet by mouth every 12 hours. 180 tablet 1    nitroglycerin (Nitrostat) 0.4 mg SL tablet Place 1 tablet (0.4 mg) under the tongue every 5 minutes if needed for chest pain. May repeat every 5 minutes for up to 3 doses. 30 tablet 1    simvastatin (Zocor) 40 mg tablet Take 1 tablet (40 mg) by mouth once daily at bedtime. 90 tablet 1    Xarelto 20 mg tablet Take 1 tablet (20 mg) by mouth once daily. 90 tablet 1    zolpidem (Ambien) 5 mg tablet Take 1 tablet (5 mg) by mouth as needed at bedtime for sleep. 30 tablet 0     No current facility-administered medications for this visit.       Immunization History   Administered Date(s) Administered    Flu vaccine, quadrivalent, high-dose, preservative free, age 65y+ (FLUZONE) 10/20/2020    Flu vaccine, trivalent, preservative free, HIGH-DOSE, age 65y+ (Fluzone) 11/13/2014, 11/08/2017, 10/12/2018, 09/28/2019, 10/20/2020, 11/17/2021, 10/26/2022, 10/24/2023    Influenza, Unspecified 11/01/2007, 11/11/2008, 10/14/2010, 09/02/2012, 09/01/2015, 09/01/2016, 11/17/2021, 10/26/2022    Novel influenza-H1N1-09, preservative-free 12/13/2009    Pfizer COVID-19 vaccine, 12 years and older, (30mcg/0.3mL) (Comirnaty) 10/24/2023    Pfizer COVID-19 vaccine, bivalent, age 12 years and older (30 mcg/0.3 mL) 09/30/2022    Pfizer Purple Cap SARS-CoV-2 02/05/2021, 03/05/2021, 04/08/2021, 10/07/2021, 09/27/2022    Pneumococcal conjugate vaccine, 13-valent (PREVNAR 13) 02/25/2016    Pneumococcal polysaccharide vaccine,  23-valent, age 2 years and older (PNEUMOVAX 23) 01/10/2013, 08/22/2018, 10/20/2021    Tdap vaccine, age 7 year and older (BOOSTRIX, ADACEL) 02/23/2018    Zoster vaccine, recombinant, adult (SHINGRIX) 08/30/2019, 12/31/2019, 02/01/2020    Zoster, live 08/24/2011        Review of Systems   Constitutional: Negative.    HENT: Negative.     Eyes: Negative.    Respiratory: Negative.     Cardiovascular: Negative.    Gastrointestinal: Negative.    Endocrine: Negative.    Genitourinary: Negative.    Musculoskeletal: Negative.    Skin: Negative.    Allergic/Immunologic: Negative.    Hematological: Negative.    Psychiatric/Behavioral: Negative.     All other systems reviewed and are negative.       Vitals:    10/21/24 1630   BP: 117/67   Pulse: 85   Temp: 36.3 °C (97.4 °F)   SpO2: 95%     Vitals:    10/21/24 1630   Weight: 88.9 kg (196 lb)      Physical Exam  Constitutional:       General: He is not in acute distress.     Appearance: Normal appearance.   Cardiovascular:      Rate and Rhythm: Normal rate and regular rhythm.      Pulses: Normal pulses.      Heart sounds: Normal heart sounds.   Pulmonary:      Effort: Pulmonary effort is normal. No respiratory distress.      Breath sounds: Normal breath sounds. No wheezing or rales.   Neurological:      Mental Status: He is alert.     Upper thoracic back area.  There is a irregular dark brown nevus present with irregular borders about 5 to 6 mm in size.    ASSESSMENT/PLAN: COVID respiratory infection resolved.    Hypertension stable.  Continue current meds including HCTZ 12.5 mg daily.    Nevus of back area.  Patient referred to his dermatologist for a follow-up and evaluation of this.    Mild cognitive dysfunction.  We discussed cognitive dysfunction in detail.  We discussed the more concrete signs of true dementia which she does not have.  He will call if there is any further worsening.    Follow-up as scheduled and call as needed       Scribe Attestation  By signing my name  below, I, Stella Donovan LPN, Scribdanilo   attest that this documentation has been prepared under the direction and in the presence of Carlos Eduardo Han MD.

## 2024-10-23 ASSESSMENT — ENCOUNTER SYMPTOMS
HEMATOLOGIC/LYMPHATIC NEGATIVE: 1
EYES NEGATIVE: 1
ALLERGIC/IMMUNOLOGIC NEGATIVE: 1
CONSTITUTIONAL NEGATIVE: 1
CARDIOVASCULAR NEGATIVE: 1
PSYCHIATRIC NEGATIVE: 1
GASTROINTESTINAL NEGATIVE: 1
RESPIRATORY NEGATIVE: 1
MUSCULOSKELETAL NEGATIVE: 1
ENDOCRINE NEGATIVE: 1

## 2024-11-16 ENCOUNTER — APPOINTMENT (OUTPATIENT)
Dept: RADIOLOGY | Facility: HOSPITAL | Age: 79
End: 2024-11-16
Payer: MEDICARE

## 2024-11-16 ENCOUNTER — HOSPITAL ENCOUNTER (EMERGENCY)
Facility: HOSPITAL | Age: 79
Discharge: HOME | End: 2024-11-16
Attending: STUDENT IN AN ORGANIZED HEALTH CARE EDUCATION/TRAINING PROGRAM
Payer: MEDICARE

## 2024-11-16 VITALS
OXYGEN SATURATION: 98 % | HEIGHT: 73 IN | HEART RATE: 78 BPM | TEMPERATURE: 97.7 F | WEIGHT: 195.55 LBS | RESPIRATION RATE: 18 BRPM | BODY MASS INDEX: 25.92 KG/M2 | DIASTOLIC BLOOD PRESSURE: 68 MMHG | SYSTOLIC BLOOD PRESSURE: 149 MMHG
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
## 2024-11-16 PROCEDURE — 73130 X-RAY EXAM OF HAND: CPT | Mod: LEFT SIDE | Performed by: RADIOLOGY

## 2024-11-16 PROCEDURE — 72125 CT NECK SPINE W/O DYE: CPT

## 2024-11-16 PROCEDURE — 72125 CT NECK SPINE W/O DYE: CPT | Performed by: STUDENT IN AN ORGANIZED HEALTH CARE EDUCATION/TRAINING PROGRAM

## 2024-11-16 PROCEDURE — 2500000001 HC RX 250 WO HCPCS SELF ADMINISTERED DRUGS (ALT 637 FOR MEDICARE OP): Performed by: STUDENT IN AN ORGANIZED HEALTH CARE EDUCATION/TRAINING PROGRAM

## 2024-11-16 PROCEDURE — 70450 CT HEAD/BRAIN W/O DYE: CPT

## 2024-11-16 PROCEDURE — 73130 X-RAY EXAM OF HAND: CPT | Mod: LT

## 2024-11-16 PROCEDURE — 70450 CT HEAD/BRAIN W/O DYE: CPT | Performed by: STUDENT IN AN ORGANIZED HEALTH CARE EDUCATION/TRAINING PROGRAM

## 2024-11-16 PROCEDURE — 99285 EMERGENCY DEPT VISIT HI MDM: CPT | Mod: 25

## 2024-11-16 RX ORDER — METHOCARBAMOL 500 MG/1
500 TABLET, FILM COATED ORAL ONCE
Status: COMPLETED | OUTPATIENT
Start: 2024-11-16 | End: 2024-11-16

## 2024-11-16 RX ORDER — ACETAMINOPHEN 325 MG/1
975 TABLET ORAL ONCE
Status: COMPLETED | OUTPATIENT
Start: 2024-11-16 | End: 2024-11-16

## 2024-11-16 RX ADMIN — METHOCARBAMOL TABLETS 500 MG: 500 TABLET, COATED ORAL at 20:41

## 2024-11-16 RX ADMIN — ACETAMINOPHEN 325MG 975 MG: 325 TABLET ORAL at 20:41

## 2024-11-16 ASSESSMENT — COLUMBIA-SUICIDE SEVERITY RATING SCALE - C-SSRS
2. HAVE YOU ACTUALLY HAD ANY THOUGHTS OF KILLING YOURSELF?: NO
6. HAVE YOU EVER DONE ANYTHING, STARTED TO DO ANYTHING, OR PREPARED TO DO ANYTHING TO END YOUR LIFE?: NO
1. IN THE PAST MONTH, HAVE YOU WISHED YOU WERE DEAD OR WISHED YOU COULD GO TO SLEEP AND NOT WAKE UP?: NO

## 2024-11-16 ASSESSMENT — PAIN DESCRIPTION - ORIENTATION
ORIENTATION: LEFT

## 2024-11-16 ASSESSMENT — LIFESTYLE VARIABLES
HAVE PEOPLE ANNOYED YOU BY CRITICIZING YOUR DRINKING: NO
HAVE YOU EVER FELT YOU SHOULD CUT DOWN ON YOUR DRINKING: NO
TOTAL SCORE: 0
EVER FELT BAD OR GUILTY ABOUT YOUR DRINKING: NO
EVER HAD A DRINK FIRST THING IN THE MORNING TO STEADY YOUR NERVES TO GET RID OF A HANGOVER: NO

## 2024-11-16 ASSESSMENT — PAIN DESCRIPTION - LOCATION
LOCATION: HAND

## 2024-11-16 ASSESSMENT — PAIN - FUNCTIONAL ASSESSMENT
PAIN_FUNCTIONAL_ASSESSMENT: 0-10
PAIN_FUNCTIONAL_ASSESSMENT: 0-10

## 2024-11-16 ASSESSMENT — PAIN DESCRIPTION - PROGRESSION: CLINICAL_PROGRESSION: GRADUALLY IMPROVING

## 2024-11-16 ASSESSMENT — PAIN SCALES - GENERAL
PAINLEVEL_OUTOF10: 2
PAINLEVEL_OUTOF10: 3
PAINLEVEL_OUTOF10: 2

## 2024-11-16 ASSESSMENT — PAIN DESCRIPTION - PAIN TYPE
TYPE: ACUTE PAIN
TYPE: ACUTE PAIN

## 2024-11-16 ASSESSMENT — PAIN DESCRIPTION - DESCRIPTORS
DESCRIPTORS: ACHING
DESCRIPTORS: ACHING

## 2024-11-17 NOTE — ED PROVIDER NOTES
Emergency Department Provider Note        History of Present Illness     History provided by: Patient  Limitations to History: None  External Records Reviewed with Brief Summary: None    HPI:  Terrell Malhotra is a 79 y.o. male with a history of A-fib on Xarelto, CAD, CKD, HTN, HLD, and carotid artery stenosis, who presented to the ED after a fall.  Patient was doing yard work on his property when he tripped beside his storage barn and fell to the ground.  Patient landed with his left hand on the rake he was using to do the yard work.  Patient endorsed left hand pain of 2-3/10 severity.  Range of motion is intact, no numbness, no tingling, capillary refill less than 2 seconds. Denies loss of consciousness, vomiting, dizziness, lightheadedness, headache, chest pain, abdominal pain, or any other joint pain.    Physical Exam   Triage vitals:  T 36 °C (96.8 °F)  HR 71  /72  RR 18  O2 97 %      General: Awake, alert, in no acute distress  Eyes: Gaze conjugate.  No scleral icterus or injection  HENT: Normo-cephalic, atraumatic. No stridor  CV: Regular rate, regular rhythm. Radial pulses 2+ bilaterally  Resp: Breathing non-labored, speaking in full sentences.  Clear to auscultation bilaterally  GI: Soft, non-distended, non-tender. No rebound or guarding.  : Not assessed.  MSK/Extremities: No gross bony deformities. Moving all extremities.  There is left hand swelling on the dorsum. 2-3/10 pain and tenderness.  Skin: Warm. Appropriate color  Neuro: Alert. Oriented. Face symmetric. Speech is fluent.  Gross strength and sensation intact in b/l UE and LEs  Psych: Appropriate mood and affect    Medical Decision Making & ED Course   Medical Decision Makin y.o. male with a history of A-fib on Xarelto, CAD, CKD, HTN, HLD, and carotid artery stenosis, who presented to the ED after a fall.      On arrival to the ED, the patient was stable, A&Ox3, non-toxic, well-appearing, and in no acute distress. Primary assessment  is evident for intact ABC. The patient was able to give account of current event and verbalize presenting symptoms.    My personal assessment is directed towards ordering CT head and CTCS to rule out acute intracranial abnormality, and Cervical spine deformity. Will order xray of the left hand to rule out acute fracture. The left hand xray shows no radiographic evidence for acute fracture. There is soft tissue swelling at the proximal aspect of the left 4th finger. 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 CT head revealed no acute intracranial abnormality. There is mild chronic microvascular ischemic change. Several sulcal calcifications in the right frontoparietal lobes likely chronic embolic. CT C-Spine lacks evidence of cervical spine fracture or traumatic malalignment.    Patient was placed in a finger splint, does not require splinting/reduction for Boxer fracture.    Patient was administered 975 mg of tylenol and 500 mg of robaxin for pain control.    The case was discussed with the attending, Dr. Florence, who saw and evaluated the patient at the bedside. Patient was updated with the imaging results and was reassured of the negative findings. On reassessment, he was hemodynamically stable and was medically cleared to be discharged home and follow up with his primary care physician regarding his ED visit. The patient was amenable with the plan. He was discharged in a stable condition with strict return precaution.    Differential diagnoses considered include but are not limited to: Fall     ED Course:  Diagnoses as of 11/18/24 1744   Fall, initial encounter     Disposition   As a result of the work-up, the patient was discharged home.  he was informed of his diagnosis and instructed to come back with any concerns or worsening of condition.  he and was agreeable to the plan as discussed above.  he was given the opportunity to ask questions.  All of the patient's questions were answered.    Procedures    Procedures    This was a shared visit with an ED attending.  The patient was seen and discussed with the ED attending Dr. Florence.    Min Hollins MD  Emergency Medicine, PGY-2     Min Hollins MD  Resident  11/18/24 1749       Mauri Florence MD  11/19/24 0777

## 2024-11-17 NOTE — DISCHARGE INSTRUCTIONS
Thank you for giving us the opportunity to take care of you.  Use over-the-counter Tylenol for pain control as needed.     Follow up with your primary care physician within 48 hours regarding your ED visit.     Seek immediate medical attention if you experience severe headache, dizziness, lightheadedness, worsening pain, swelling, numbness, tingling,, or for any other reason.   DEPERSONALIZATION

## 2024-11-18 ENCOUNTER — APPOINTMENT (OUTPATIENT)
Dept: PRIMARY CARE | Facility: CLINIC | Age: 79
End: 2024-11-18
Payer: MEDICARE

## 2024-11-19 ENCOUNTER — APPOINTMENT (OUTPATIENT)
Dept: PHYSICAL THERAPY | Facility: CLINIC | Age: 79
End: 2024-11-19
Payer: MEDICARE

## 2024-11-20 ENCOUNTER — APPOINTMENT (OUTPATIENT)
Dept: ALLERGY | Facility: CLINIC | Age: 79
End: 2024-11-20
Payer: MEDICARE

## 2024-11-20 VITALS
OXYGEN SATURATION: 96 % | DIASTOLIC BLOOD PRESSURE: 66 MMHG | RESPIRATION RATE: 17 BRPM | TEMPERATURE: 97.6 F | HEART RATE: 57 BPM | SYSTOLIC BLOOD PRESSURE: 130 MMHG | WEIGHT: 196 LBS | BODY MASS INDEX: 25.98 KG/M2 | HEIGHT: 73 IN

## 2024-11-20 DIAGNOSIS — J34.89 RHINORRHEA: ICD-10-CM

## 2024-11-20 DIAGNOSIS — J31.0 GUSTATORY RHINITIS: ICD-10-CM

## 2024-11-20 DIAGNOSIS — J30.81 ALLERGIC TO PETS: Primary | ICD-10-CM

## 2024-11-20 PROCEDURE — 99204 OFFICE O/P NEW MOD 45 MIN: CPT | Performed by: ALLERGY & IMMUNOLOGY

## 2024-11-20 PROCEDURE — 95004 PERQ TESTS W/ALRGNC XTRCS: CPT | Performed by: ALLERGY & IMMUNOLOGY

## 2024-11-20 RX ORDER — AZELASTINE 1 MG/ML
2 SPRAY, METERED NASAL 2 TIMES DAILY PRN
Qty: 30 ML | Refills: 5 | Status: SHIPPED | OUTPATIENT
Start: 2024-11-20 | End: 2025-11-20

## 2024-11-20 NOTE — PATIENT INSTRUCTIONS
"Allergy to cat, dog and horse  -I recommend a daily nasal spray as needed.  -I will send in Berkleyelin to try first    I recommend frequent vacuuming in the home and frequent/regular grooming of your pet.    Nonallergic Rhinitis - Vasomotor/Gustatory Rhinitis  Nonallergic rhinitis (vasomotor rhinitis) is a condition that causes chronic sneezing, congestion, or runny nose. While these symptoms are similar to those of allergic rhinitis (hay fever), nonallergic rhinitis is different because, unlike an allergy, it doesn’t involve the immune system. An allergic reaction occurs when the immune system overreacts to an otherwise harmless substance known as an allergen.    Symptoms  Airborne pollutants or odors, certain foods or beverages, some medications, changes in the weather or underlying chronic health problems can all trigger symptoms of nonallergic rhinitis. These symptoms can come and go, or be constant. Sneezing and rhinitis after a large meal has been called \"Snatiation\"-a combination of the word sneeze + satiate.  This is defined as sneezing after a large meal.    The most common symptoms of nonallergic rhinitis are:    Stuffy nose  Runny nose  Sneezing  Postnasal drip  Unlike the allergic form, nonallergic rhinitis rarely causes itchy nose, eyes or throat.    Diagnosis and Treatment  It is  important to have an accurate diagnosis so you can manage your condition appropriately. Because the symptoms are so similar, allergy testing is often recommended to rule out allergic rhinitis.    Nonallergic rhinitis cannot be cured, but many people find relief by avoiding triggers, using a saline rinse solution or by taking over-the-counter or prescription medications.  "

## 2024-11-20 NOTE — PROGRESS NOTES
Patient ID: Terrell Malhotra is a 79 y.o. male.     Chief Complaint: NPV referred by Dr. Han  Here with wife Carol  History Of Present Illness  Terrell Malhotra is a 79 y.o. male with PMx chronic sneezing presenting for consultation.     Food Allergy  No    Eczema/ Atopic Dermatitis  No    Asthma  No    Rhinoconjunctivitis  Sneezes excessively after eating.  Not associated with any particular food or beverage.  More so with larger meals.  Takes no medication for this.  Also with rhinorrhea without sneeze with meals, sometimes without.  Occurs at home and when outside the home.    Drug Allergy   Reviewed in chart    Insect Allergy   No    Infections  No history of frequent or recurrent infections    He did recently fall and injure his hand and fingers.    Review of Systems    Pertinent positives and negatives have been assessed in the HPI. All other systems have been reviewed and are negative except as noted in the HPI.    Allergies  Allopurinol and Lisinopril    Past Medical History  He has a past medical history of Body mass index (BMI) 26.0-26.9, adult, Difficulty in walking, not elsewhere classified (10/15/2021), History of falling, Muscle weakness (generalized) (10/15/2021), Overweight, Personal history of other specified conditions (01/02/2019), Personal history of other specified conditions (10/15/2021), Personal history of other specified conditions (10/15/2021), Personal history of other specified conditions (10/15/2021), Repeated falls (10/15/2021), and Unspecified fracture of lower end of unspecified humerus, initial encounter for closed fracture.    Family History  Family History   Problem Relation Name Age of Onset    Cancer Mother      Stroke Mother         Surgical History  He has a past surgical history that includes Other surgical history (11/29/2021); Other surgical history (11/29/2021); Other surgical history (11/29/2021); Other surgical history (11/29/2021); Other surgical history (11/29/2021);  Other surgical history (11/29/2021); Other surgical history (11/29/2021); Other surgical history (11/29/2021); Other surgical history (11/29/2021); Other surgical history (11/29/2021); Other surgical history (11/29/2021); Other surgical history (11/29/2021); and Other surgical history (02/07/2022).    Social/Environmental History  He reports that he quit smoking about 21 years ago. His smoking use included cigarettes. He has never used smokeless tobacco. He reports current alcohol use of about 7.0 standard drinks of alcohol per week. He reports that he does not currently use drugs.    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      calcium carbonate-vitamin D3 600 mg-5 mcg (200 unit) tablet Take 1 tablet by mouth once daily.      docosahexaenoic acid/epa (FISH OIL ORAL) Take 1 capsule by mouth once daily.      febuxostat (Uloric) 40 mg tablet Take 1 tablet (40 mg) by mouth once daily. 90 tablet 1    hydroCHLOROthiazide (Microzide) 12.5 mg tablet Take 1 tablet (12.5 mg) by mouth once daily. 90 tablet 1    losartan (Cozaar) 50 mg tablet Take 1 tablet (50 mg) by mouth once daily. 90 tablet 1    metoprolol tartrate (Lopressor) 50 mg tablet Take 1 tablet by mouth every 12 hours. 180 tablet 1    nitroglycerin (Nitrostat) 0.4 mg SL tablet Place 1 tablet (0.4 mg) under the tongue every 5 minutes if needed for chest pain. May repeat every 5 minutes for up to 3 doses. 30 tablet 1    simvastatin (Zocor) 40 mg tablet Take 1 tablet (40 mg) by mouth once daily at bedtime. 90 tablet 1    Xarelto 20 mg tablet Take 1 tablet (20 mg) by mouth once daily. 90 tablet 1    zolpidem (Ambien) 5 mg tablet Take 1 tablet (5 mg)  "by mouth as needed at bedtime for sleep. 30 tablet 0     No current facility-administered medications on file prior to visit.         Physical Exam  Visit Vitals  /66   Pulse 57   Temp 36.4 °C (97.6 °F) (Temporal)   Resp 17   Ht 1.854 m (6' 1\")   Wt 88.9 kg (196 lb)   SpO2 96%   BMI 25.86 kg/m²   Smoking Status Former   BSA 2.14 m²       Wt Readings from Last 1 Encounters:   11/20/24 88.9 kg (196 lb)       Physical Exam    General: Well appearing, no acute distress  Head: Normocephalic, atraumatic, neck supple without lymphadenopathy  Eyes: non-injected  Ears: Tm's normal landmarks  Nose: No nasal crease, nares patent, minimal discharge  Throat: Normal dentition, no erythema  Heart: Regular rate and rhythm  Lungs: Clear to auscultation bilaterally, effort normal  Extremities: Left hand and fingers swollen and bruised  Skin: No rashes/lesions  Psych: normal mood and affect    LAB RESULTS:  CBC:  Recent Labs     02/21/24  0945 01/26/23  1110 06/08/22  0936   WBC 7.0 7.8 6.5   HGB 14.6 15.0 13.7   HCT 44.5 45.7 42.5    186 169    100 101*       CMP:  Recent Labs     09/03/24  1227 02/21/24  0945 01/26/23  1110    140 139   K 3.8 4.3 4.7    101 103   CO2 28 33* 29   ANIONGAP 12 10 12   BUN 21 22 22   CREATININE 1.42* 1.38* 1.39*   EGFR 51* 52*  --      Recent Labs     09/03/24  1227 02/21/24  0945 01/26/23  1110   ALBUMIN 3.8 3.8 4.2   ALKPHOS 66 68 69   ALT 18 20 18   AST 18 20 20   BILITOT 0.7 0.9 1.1         Allergy skin tests:reviewed and scanned    Assessment/Plan   80 yo man with a history of paroxsymal sneeze, rhinorrhea particularly noted with eating.  Rhinorrhea also occurs at other times.  He has positive allergy tests for cat, dog and horse.  We discussed that there are a couple of different things going on. 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 I recommended measures to improve this and prescribed as needed intranasal Astelin.  If this medication does not help, there are " others we may try, so follow up if this is the case.  He also has a dog in the home, so we discussed some measures to reduce dander.    Follow up when needed.    Josselin Esparza, DO

## 2024-11-26 ENCOUNTER — APPOINTMENT (OUTPATIENT)
Dept: PHYSICAL THERAPY | Facility: CLINIC | Age: 79
End: 2024-11-26
Payer: MEDICARE

## 2024-12-03 ENCOUNTER — APPOINTMENT (OUTPATIENT)
Dept: PHYSICAL THERAPY | Facility: CLINIC | Age: 79
End: 2024-12-03
Payer: MEDICARE

## 2024-12-10 ENCOUNTER — APPOINTMENT (OUTPATIENT)
Dept: PHYSICAL THERAPY | Facility: CLINIC | Age: 79
End: 2024-12-10
Payer: MEDICARE

## 2025-02-10 DIAGNOSIS — I10 ESSENTIAL HYPERTENSION: ICD-10-CM

## 2025-02-15 RX ORDER — HYDROCHLOROTHIAZIDE 12.5 MG/1
12.5 TABLET ORAL DAILY
Qty: 90 TABLET | Refills: 3 | Status: SHIPPED | OUTPATIENT
Start: 2025-02-15

## 2025-02-20 ENCOUNTER — APPOINTMENT (OUTPATIENT)
Dept: PRIMARY CARE | Facility: CLINIC | Age: 80
End: 2025-02-20
Payer: MEDICARE

## 2025-02-24 ENCOUNTER — OFFICE VISIT (OUTPATIENT)
Dept: WOUND CARE | Facility: CLINIC | Age: 80
End: 2025-02-24
Payer: MEDICARE

## 2025-02-24 PROCEDURE — 99213 OFFICE O/P EST LOW 20 MIN: CPT | Mod: 25

## 2025-02-24 PROCEDURE — 11042 DBRDMT SUBQ TIS 1ST 20SQCM/<: CPT | Performed by: PODIATRIST

## 2025-02-24 PROCEDURE — 99203 OFFICE O/P NEW LOW 30 MIN: CPT | Performed by: PODIATRIST

## 2025-02-24 PROCEDURE — 11042 DBRDMT SUBQ TIS 1ST 20SQCM/<: CPT

## 2025-07-22 ENCOUNTER — HOSPITAL ENCOUNTER (OUTPATIENT)
Dept: RADIOLOGY | Facility: HOSPITAL | Age: 80
Discharge: HOME | End: 2025-07-22
Payer: MEDICARE

## 2025-07-22 DIAGNOSIS — N20.0 CALCULUS OF KIDNEY: ICD-10-CM

## 2025-07-22 PROCEDURE — 76770 US EXAM ABDO BACK WALL COMP: CPT

## 2025-07-22 PROCEDURE — 76770 US EXAM ABDO BACK WALL COMP: CPT | Performed by: RADIOLOGY
